# Patient Record
Sex: FEMALE | Race: WHITE | NOT HISPANIC OR LATINO | Employment: OTHER | URBAN - METROPOLITAN AREA
[De-identification: names, ages, dates, MRNs, and addresses within clinical notes are randomized per-mention and may not be internally consistent; named-entity substitution may affect disease eponyms.]

---

## 2017-03-09 ENCOUNTER — HOSPITAL ENCOUNTER (OUTPATIENT)
Dept: LAB | Facility: MEDICAL CENTER | Age: 72
End: 2017-03-09
Attending: INTERNAL MEDICINE
Payer: MEDICARE

## 2017-03-09 LAB — 25(OH)D3 SERPL-MCNC: 56 NG/ML (ref 30–100)

## 2017-03-09 PROCEDURE — 82306 VITAMIN D 25 HYDROXY: CPT

## 2017-03-09 PROCEDURE — 36415 COLL VENOUS BLD VENIPUNCTURE: CPT

## 2017-03-10 NOTE — PROGRESS NOTES
Quick Note:    The result was discussed with patient via My Chart.    Angela Lakhani M.D.    ______

## 2017-03-21 ENCOUNTER — TELEPHONE (OUTPATIENT)
Dept: MEDICAL GROUP | Facility: MEDICAL CENTER | Age: 72
End: 2017-03-21

## 2017-03-21 NOTE — TELEPHONE ENCOUNTER
1. Caller Name: Maryann Cardona                                           Call Back Number: [phone] (home) [phone] (work)        Patient approves a detailed voicemail message: N\A    Patient wanted to know if you would order labs for the patient for fasting AM Cortisol and Phosphorous per patient endocrinologist Dr.Scott Bishop ? Thank you Please advise

## 2017-04-13 ENCOUNTER — OFFICE VISIT (OUTPATIENT)
Dept: MEDICAL GROUP | Age: 72
End: 2017-04-13
Payer: MEDICARE

## 2017-04-13 VITALS
TEMPERATURE: 98.2 F | BODY MASS INDEX: 18.9 KG/M2 | OXYGEN SATURATION: 97 % | SYSTOLIC BLOOD PRESSURE: 106 MMHG | RESPIRATION RATE: 16 BRPM | HEART RATE: 82 BPM | WEIGHT: 132 LBS | HEIGHT: 70 IN | DIASTOLIC BLOOD PRESSURE: 68 MMHG

## 2017-04-13 DIAGNOSIS — E78.00 HYPERCHOLESTEROLEMIA: ICD-10-CM

## 2017-04-13 DIAGNOSIS — Z12.31 VISIT FOR SCREENING MAMMOGRAM: ICD-10-CM

## 2017-04-13 DIAGNOSIS — Z86.39 HISTORY OF HYPERPARATHYROIDISM: ICD-10-CM

## 2017-04-13 DIAGNOSIS — M81.0 OSTEOPOROSIS: ICD-10-CM

## 2017-04-13 DIAGNOSIS — G47.25 JET LAG: ICD-10-CM

## 2017-04-13 PROCEDURE — 99214 OFFICE O/P EST MOD 30 MIN: CPT | Performed by: INTERNAL MEDICINE

## 2017-04-13 RX ORDER — ALPRAZOLAM 0.25 MG/1
0.25 TABLET ORAL NIGHTLY PRN
Qty: 20 TAB | Refills: 0 | Status: SHIPPED | OUTPATIENT
Start: 2017-04-13 | End: 2017-12-19 | Stop reason: SDUPTHER

## 2017-04-13 ASSESSMENT — PAIN SCALES - GENERAL: PAINLEVEL: NO PAIN

## 2017-04-13 NOTE — MR AVS SNAPSHOT
"        Maryann Cardona   2017 3:20 PM   Office Visit   MRN: [id number]    Department:  [address] [place] [address]   Dept Phone:  [phone]    Description:  Female : 1945   Provider:  Angela Lakhani M.D.           Reason for Visit     Establish Care           Allergies as of 2017     Allergen Noted Reactions    Ofloxacin 2011   Shortness of Breath, Rash, Swelling    Severe facial and neck swelling    Vicodin [Hydrocodone-Acetaminophen] 2011   Anxiety    Ambien [Zolpidem Tartrate] 2012       hallucinations      You were diagnosed with     History of hyperparathyroidism   [715201]       Hypercholesterolemia   [495471]       Osteoporosis   [9847614]       Visit for screening mammogram   [330140]       Jet lag   [194475]         Vital Signs     Blood Pressure Pulse Temperature Respirations Height Weight    106/68 mmHg 82 36.8 °C (98.2 °F) 16 1.778 m (5' 10\") 59.875 kg (132 lb)    Body Mass Index Oxygen Saturation Breastfeeding? Smoking Status          18.94 kg/m2 97% No Never Smoker         Basic Information     Date Of Birth Sex Race Ethnicity Preferred Language    1945 Female White Non- English      Your appointments     May 02, 2017  1:40 PM   MA SCRN10 with S EDELAN MG 1   [facility] ([place])    [address]  [place] [id number] [phone]           No deodorant, powder, perfume or lotion under the arm or breast area.            Dec 19, 2017  1:00 PM   ANNUAL EXAM PREVENTATIVE with Angela Lakhani M.D.   [address] [place] [id number] [phone]              Problem List              ICD-10-CM Priority Class Noted - Resolved    Osteoporosis M81.0   11/3/2015 - Present    Glaucoma H40.9   11/3/2015 - Present    Underweight R63.6   11/3/2015 - Present    History of hyperparathyroidism Z86.39   2016 - Present    Hypercholesterolemia E78.00   " 4/13/2017 - Present    Jet lag G47.25   4/13/2017 - Present      Health Maintenance        Date Due Completion Dates    MAMMOGRAM 1/28/2017 1/28/2016, 1/27/2015, 12/30/2013, 12/12/2012, 12/9/2011, 11/16/2010, 10/2/2009, 10/2/2009, 10/1/2008, 10/1/2008, 9/14/2007, 9/14/2007    BONE DENSITY 1/28/2018 1/28/2016, 1/27/2015, 12/12/2012, 8/17/2011, 10/1/2008, 10/26/2006    COLONOSCOPY 11/3/2025 11/3/2015 (Postponed)    Override on 11/3/2015: Postponed    IMM DTaP/Tdap/Td Vaccine (2 - Td) 3/27/2026 3/27/2016            Current Immunizations     13-VALENT PCV PREVNAR 12/13/2016    Pneumococcal polysaccharide vaccine (PPSV-23) 9/24/2011  6:44 AM    SHINGLES VACCINE 9/16/2013    Tdap Vaccine 3/27/2016  9:27 PM      Below and/or attached are the medications your provider expects you to take. Review all of your home medications and newly ordered medications with your provider and/or pharmacist. Follow medication instructions as directed by your provider and/or pharmacist. Please keep your medication list with you and share with your provider. Update the information when medications are discontinued, doses are changed, or new medications (including over-the-counter products) are added; and carry medication information at all times in the event of emergency situations     Allergies:  OFLOXACIN - Shortness of Breath,Rash,Swelling     VICODIN - Anxiety     AMBIEN - (reactions not documented)               Medications  Valid as of: April 13, 2017 -  4:23 PM    Generic Name Brand Name Tablet Size Instructions for use    ALPRAZolam (Tab) XANAX 0.25 MG Take 1 Tab by mouth at bedtime as needed for Sleep.        Brimonidine Tartrate (Solution) ALPHAGAN P 0.1 % 1 Drop by Ophthalmic route 2 Times a Day.        Dorzolamide HCl-Timolol Mal (Solution) COSOPT 22.3-6.8 MG/ML Place 1 Drop in both eyes 2 times a day.        Latanoprost (Solution) XALATAN 0.005 % Place 1 Drop in both eyes every evening.        Naproxen Sodium   Take  by mouth.         .                 Medicines prescribed today were sent to:     [id number] - [place], [place] - [address] AT [facility] [address]    [address] [place] [address]    Phone: [phone] Fax: [phone]    Open 24 Hours?: No      Medication refill instructions:       If your prescription bottle indicates you have medication refills left, it is not necessary to call your provider’s office. Please contact your pharmacy and they will refill your medication.    If your prescription bottle indicates you do not have any refills left, you may request refills at any time through one of the following ways: The online [employer] system (except Urgent Care), by calling your provider’s office, or by asking your pharmacy to contact your provider’s office with a refill request. Medication refills are processed only during regular business hours and may not be available until the next business day. Your provider may request additional information or to have a follow-up visit with you prior to refilling your medication.   *Please Note: Medication refills are assigned a new Rx number when refilled electronically. Your pharmacy may indicate that no refills were authorized even though a new prescription for the same medication is available at the pharmacy. Please request the medicine by name with the pharmacy before contacting your provider for a refill.        Your To Do List     Future Labs/Procedures Complete By Expires    CBC WITH DIFFERENTIAL  As directed 4/14/2018    COMP METABOLIC PANEL  As directed 4/14/2018    LIPID PROFILE  As directed 4/14/2018    MA-SCREEN MAMMO W/CAD-BILAT  As directed 5/15/2018    PHOSPHORUS  As directed 4/13/2018    VITAMIN D,25 HYDROXY  As directed 4/14/2018         [employer] Access Code: Activation code not generated  Current Pro[employer] Status: Active

## 2017-04-14 NOTE — ASSESSMENT & PLAN NOTE
Patient stated that she traveled a lot and she has jet lag during traveling. She wants to have medication for jet lag. She did not tolerate Ambien due to hangover effect in next day. We discussed to try low dose Xanax for one- two days.

## 2017-04-14 NOTE — ASSESSMENT & PLAN NOTE
Patient reported that she has hyperparathyroidism in the past and one parathyroid gland was removed. She follows with Dr. Souza, her endocrinologist once a year. She had ultrasound Neck at endocrinologist clinic last week and was told that it was normal. Last CMP in December 2016 showed normal calcium level. She requested to test for phosphorus in future lab.

## 2017-04-14 NOTE — ASSESSMENT & PLAN NOTE
Patient has osteoporosis. She declined to be treated with alendronate or prolia or any bisphosphonates as she concerned potential side effects from taking bisphosphonate. She stated that she did not have side effects when she was treated with alendronate. She insisted not to be treated. Her mom had severe side effects from taking bisphosphonate. She takes her vitamin D and calcium regularly. She also discussed with her endocrinologist and she declined the medication treatment by endocrinologist.  I have reviewed her last DEXA scan done in 1/2016. I also discussed treatment options with patient again. However, she declined it and stated that she understands the risks of fracture, but she will not take medication.

## 2017-04-14 NOTE — PROGRESS NOTES
Subjective:   Maryann Cardona is a 72 y.o. female here today for evaluation and management of:      History of hyperparathyroidism  Patient reported that she has hyperparathyroidism in the past and one parathyroid gland was removed. She follows with Dr. Souza, her endocrinologist once a year. She had ultrasound Neck at endocrinologist clinic last week and was told that it was normal. Last CMP in December 2016 showed normal calcium level. She requested to test for phosphorus in future lab.    Hypercholesterolemia  Patient has elevated total cholesterol and LDL cholesterol. She declined to be treated with medications. She attempts to control her cholesterol with diet and exercise. However, she likes to eat a lot of cheese and butter. She reported that she exercises regularly.    Jet lag  Patient stated that she traveled a lot and she has jet lag during traveling. She wants to have medication for jet lag. She did not tolerate Ambien due to hangover effect in next day. We discussed to try low dose Xanax for one- two days.    Osteoporosis  Patient has osteoporosis. She declined to be treated with alendronate or prolia or any bisphosphonates as she concerned potential side effects from taking bisphosphonate. She stated that she did not have side effects when she was treated with alendronate. She insisted not to be treated. Her mom had severe side effects from taking bisphosphonate. She takes her vitamin D and calcium regularly. She also discussed with her endocrinologist and she declined the medication treatment by endocrinologist.  I have reviewed her last DEXA scan done in 1/2016. I also discussed treatment options with patient again. However, she declined it and stated that she understands the risks of fracture, but she will not take medication.         Current medicines (including changes today)  Current Outpatient Prescriptions   Medication Sig Dispense Refill   • alprazolam (XANAX) 0.25 MG Tab Take 1 Tab by  "mouth at bedtime as needed for Sleep. 20 Tab 0   • Naproxen Sodium (ALEVE PO) Take  by mouth.     • Brimonidine Tartrate (ALPHAGAN P) 0.1 % SOLN 1 Drop by Ophthalmic route 2 Times a Day.     • dorzolamide-timolol (COSOPT) 22.3-6.8 MG/ML SOLN Place 1 Drop in both eyes 2 times a day.     • latanoprost (XALATAN) 0.005 % SOLN Place 1 Drop in both eyes every evening.       No current facility-administered medications for this visit.     She  has a past medical history of Arrhythmia; Other specified disorder of intestines; Glaucoma; Pain; CATARACT; and Anesthesia.    ROS   No chest pain, no shortness of breath, no abdominal pain       Objective:     Blood pressure 106/68, pulse 82, temperature 36.8 °C (98.2 °F), resp. rate 16, height 1.778 m (5' 10\"), weight 59.875 kg (132 lb), SpO2 97 %, not currently breastfeeding. Body mass index is 18.94 kg/(m^2).   Physical Exam:  General: Alert, oriented and no acute distress.  Eye contact is good, speech goal directed, affect calm  HEENT: conjunctiva non-injected, sclera non-icteric.  Oral mucous membranes pink and moist with no lesions.  Pinna normal.   Lungs: Normal respiratory effort, clear to auscultation bilaterally with good excursion.  CV: regular rate and rhythm. No murmurs.   Abdomen: soft, non distended, nontender, Bowel sound normal.  Ext: no edema, color normal, vascularity normal, temperature normal        Assessment and Plan:   The following treatment plan was discussed     1. History of hyperparathyroidism  - Stable. Follows with endocrinologist once a year.  - CBC WITH DIFFERENTIAL; Future  - COMP METABOLIC PANEL; Future  - PHOSPHORUS; Future    2. Hypercholesterolemia  - Not on medication. Patient declined to be treated with medication.  - Advised to eat low fat, low carbohydrate and high fiber diet as well as do cardio physical exercise regularly.   - COMP METABOLIC PANEL; Future  - LIPID PROFILE; Future    3. Osteoporosis  - Patient declined to take " bisphosphonates. Continue vitamin D and calcium supplements. Encouraged to do weightbearing exercise to maintain healthy body weight. She has DEXA scan ordered by endocrinologist.  - CBC WITH DIFFERENTIAL; Future  - COMP METABOLIC PANEL; Future  - VITAMIN D,25 HYDROXY; Future  - PHOSPHORUS; Future    4. Jet lag  - Will prescribe small dose of Xanax. She is advised to rare use only due to sedation and addiction.  - Advised to be cautious for sedation effect of Xanax and recommend not to take it with alcohol or other sedating medications. Recommend to avoid driving or operating heavy machinery when she feels drowsy or sleepy from taking medication.  - I have reviewed the Prescription Monitoring Program () today before prescribing medication. No concerns on  review today.  - alprazolam (XANAX) 0.25 MG Tab; Take 1 Tab by mouth at bedtime as needed for Sleep.  Dispense: 20 Tab; Refill: 0    5. Visit for screening mammogram  - Order screening mammogram.  - MA-SCREEN MAMMO W/CAD-BILAT; Future        Followup: Return in about 1 year (around 4/13/2018), or if symptoms worsen or fail to improve, for annual exam.      Please note that this dictation was created using voice recognition software. I have made every reasonable attempt to correct obvious errors, but I expect that there may have unintended errors in text, spelling, punctuation, or grammar that I did not discover.

## 2017-04-14 NOTE — ASSESSMENT & PLAN NOTE
Patient has elevated total cholesterol and LDL cholesterol. She declined to be treated with medications. She attempts to control her cholesterol with diet and exercise. However, she likes to eat a lot of cheese and butter. She reported that she exercises regularly.

## 2017-05-02 ENCOUNTER — APPOINTMENT (OUTPATIENT)
Dept: RADIOLOGY | Facility: MEDICAL CENTER | Age: 72
End: 2017-05-02
Attending: INTERNAL MEDICINE
Payer: MEDICARE

## 2017-05-04 ENCOUNTER — HOSPITAL ENCOUNTER (OUTPATIENT)
Dept: LAB | Facility: MEDICAL CENTER | Age: 72
End: 2017-05-04
Attending: INTERNAL MEDICINE
Payer: MEDICARE

## 2017-05-04 DIAGNOSIS — M81.0 OSTEOPOROSIS: ICD-10-CM

## 2017-05-04 DIAGNOSIS — Z86.39 HISTORY OF HYPERPARATHYROIDISM: ICD-10-CM

## 2017-05-04 LAB
CORTIS SERPL-MCNC: 13.6 UG/DL (ref 0–23)
PHOSPHATE SERPL-MCNC: 3.6 MG/DL (ref 2.5–4.5)

## 2017-05-04 PROCEDURE — 36415 COLL VENOUS BLD VENIPUNCTURE: CPT

## 2017-05-04 PROCEDURE — 84100 ASSAY OF PHOSPHORUS: CPT

## 2017-08-09 ENCOUNTER — HOSPITAL ENCOUNTER (OUTPATIENT)
Dept: RADIOLOGY | Facility: MEDICAL CENTER | Age: 72
End: 2017-08-09
Attending: INTERNAL MEDICINE
Payer: MEDICARE

## 2017-08-09 DIAGNOSIS — Z12.31 VISIT FOR SCREENING MAMMOGRAM: ICD-10-CM

## 2017-08-09 PROCEDURE — 77063 BREAST TOMOSYNTHESIS BI: CPT

## 2017-11-27 ENCOUNTER — APPOINTMENT (RX ONLY)
Dept: URBAN - METROPOLITAN AREA CLINIC 108 | Facility: CLINIC | Age: 72
Setting detail: DERMATOLOGY
End: 2017-11-27

## 2017-11-27 DIAGNOSIS — L57.8 OTHER SKIN CHANGES DUE TO CHRONIC EXPOSURE TO NONIONIZING RADIATION: ICD-10-CM

## 2017-11-27 DIAGNOSIS — D485 NEOPLASM OF UNCERTAIN BEHAVIOR OF SKIN: ICD-10-CM

## 2017-11-27 PROBLEM — D48.5 NEOPLASM OF UNCERTAIN BEHAVIOR OF SKIN: Status: ACTIVE | Noted: 2017-11-27

## 2017-11-27 PROCEDURE — 99202 OFFICE O/P NEW SF 15 MIN: CPT | Mod: 25

## 2017-11-27 PROCEDURE — 11100: CPT

## 2017-11-27 PROCEDURE — ? COUNSELING

## 2017-11-27 PROCEDURE — ? BIOPSY BY SHAVE METHOD

## 2017-11-27 ASSESSMENT — LOCATION SIMPLE DESCRIPTION DERM: LOCATION SIMPLE: LEFT PRETIBIAL REGION

## 2017-11-27 ASSESSMENT — LOCATION ZONE DERM: LOCATION ZONE: LEG

## 2017-11-27 ASSESSMENT — LOCATION DETAILED DESCRIPTION DERM: LOCATION DETAILED: LEFT DISTAL PRETIBIAL REGION

## 2017-11-27 NOTE — PROCEDURE: BIOPSY BY SHAVE METHOD
Curettage Text: The wound bed was treated with curettage after the biopsy was performed.
Lab Facility: [facility]
Anesthesia Type: 1% lidocaine with epinephrine
Silver Nitrate Text: The wound bed was treated with silver nitrate after the biopsy was performed.
Bill For Surgical Tray: no
Billing Type: [employer]
Electrodesiccation Text: The wound bed was treated with electrodesiccation after the biopsy was performed.
Wound Care: Vaseline
Notification Instructions: Patient will be notified of biopsy results. However, patient instructed to call the office if not contacted within 2 weeks.
X Size Of Lesion In Cm: 0
Detail Level: Detailed
Dressing: bandage
Anesthesia Volume In Cc (Will Not Render If 0): 0.5
Biopsy Method: Personna blade
Biopsy Type: H and E
Cryotherapy Text: The wound bed was treated with cryotherapy after the biopsy was performed.
Hemostasis: Aluminum Chloride
Lab: [facility]
Post-Care Instructions: I reviewed with the patient in detail post-care instructions. Patient is to keep the biopsy site dry overnight, and then apply bacitracin twice daily until healed. Patient may apply hydrogen peroxide soaks to remove any crusting.
Consent: Written consent was obtained and risks were reviewed including but not limited to scarring, infection, bleeding, scabbing, incomplete removal, nerve damage and allergy to anesthesia.
Electrodesiccation And Curettage Text: The wound bed was treated with electrodesiccation and curettage after the biopsy was performed.
Type Of Destruction Used: Curettage
Body Location Override (Optional - Billing Will Still Be Based On Selected Body Map Location If Applicable): left lateral calf

## 2017-12-12 ENCOUNTER — HOSPITAL ENCOUNTER (OUTPATIENT)
Dept: LAB | Facility: MEDICAL CENTER | Age: 72
End: 2017-12-12
Attending: INTERNAL MEDICINE
Payer: MEDICARE

## 2017-12-12 ENCOUNTER — PATIENT OUTREACH (OUTPATIENT)
Dept: HEALTH INFORMATION MANAGEMENT | Facility: OTHER | Age: 72
End: 2017-12-12

## 2017-12-12 DIAGNOSIS — E78.00 HYPERCHOLESTEROLEMIA: ICD-10-CM

## 2017-12-12 DIAGNOSIS — Z86.39 HISTORY OF HYPERPARATHYROIDISM: ICD-10-CM

## 2017-12-12 DIAGNOSIS — M81.0 OSTEOPOROSIS: ICD-10-CM

## 2017-12-12 LAB
25(OH)D3 SERPL-MCNC: 54 NG/ML (ref 30–100)
ALBUMIN SERPL BCP-MCNC: 4.3 G/DL (ref 3.2–4.9)
ALBUMIN/GLOB SERPL: 1.7 G/DL
ALP SERPL-CCNC: 70 U/L (ref 30–99)
ALT SERPL-CCNC: 18 U/L (ref 2–50)
ANION GAP SERPL CALC-SCNC: 6 MMOL/L (ref 0–11.9)
AST SERPL-CCNC: 19 U/L (ref 12–45)
BASOPHILS # BLD AUTO: 1.4 % (ref 0–1.8)
BASOPHILS # BLD: 0.07 K/UL (ref 0–0.12)
BILIRUB SERPL-MCNC: 0.5 MG/DL (ref 0.1–1.5)
BUN SERPL-MCNC: 16 MG/DL (ref 8–22)
CALCIUM SERPL-MCNC: 9.7 MG/DL (ref 8.5–10.5)
CHLORIDE SERPL-SCNC: 106 MMOL/L (ref 96–112)
CHOLEST SERPL-MCNC: 206 MG/DL (ref 100–199)
CO2 SERPL-SCNC: 25 MMOL/L (ref 20–33)
CORTIS SERPL-MCNC: 14.4 UG/DL (ref 0–23)
CREAT SERPL-MCNC: 0.89 MG/DL (ref 0.5–1.4)
EOSINOPHIL # BLD AUTO: 0.43 K/UL (ref 0–0.51)
EOSINOPHIL NFR BLD: 8.3 % (ref 0–6.9)
ERYTHROCYTE [DISTWIDTH] IN BLOOD BY AUTOMATED COUNT: 44 FL (ref 35.9–50)
GFR SERPL CREATININE-BSD FRML MDRD: >60 ML/MIN/1.73 M 2
GLOBULIN SER CALC-MCNC: 2.5 G/DL (ref 1.9–3.5)
GLUCOSE SERPL-MCNC: 93 MG/DL (ref 65–99)
HCT VFR BLD AUTO: 42 % (ref 37–47)
HDLC SERPL-MCNC: 93 MG/DL
HGB BLD-MCNC: 13.6 G/DL (ref 12–16)
IMM GRANULOCYTES # BLD AUTO: 0.01 K/UL (ref 0–0.11)
IMM GRANULOCYTES NFR BLD AUTO: 0.2 % (ref 0–0.9)
LDLC SERPL CALC-MCNC: 96 MG/DL
LYMPHOCYTES # BLD AUTO: 1.87 K/UL (ref 1–4.8)
LYMPHOCYTES NFR BLD: 36.2 % (ref 22–41)
MCH RBC QN AUTO: 31.3 PG (ref 27–33)
MCHC RBC AUTO-ENTMCNC: 32.4 G/DL (ref 33.6–35)
MCV RBC AUTO: 96.8 FL (ref 81.4–97.8)
MONOCYTES # BLD AUTO: 0.4 K/UL (ref 0–0.85)
MONOCYTES NFR BLD AUTO: 7.7 % (ref 0–13.4)
NEUTROPHILS # BLD AUTO: 2.39 K/UL (ref 2–7.15)
NEUTROPHILS NFR BLD: 46.2 % (ref 44–72)
NRBC # BLD AUTO: 0 K/UL
NRBC BLD AUTO-RTO: 0 /100 WBC
PLATELET # BLD AUTO: 291 K/UL (ref 164–446)
PMV BLD AUTO: 9.7 FL (ref 9–12.9)
POTASSIUM SERPL-SCNC: 4.1 MMOL/L (ref 3.6–5.5)
PROT SERPL-MCNC: 6.8 G/DL (ref 6–8.2)
RBC # BLD AUTO: 4.34 M/UL (ref 4.2–5.4)
SODIUM SERPL-SCNC: 137 MMOL/L (ref 135–145)
TRIGL SERPL-MCNC: 85 MG/DL (ref 0–149)
TSH SERPL DL<=0.005 MIU/L-ACNC: 1.49 UIU/ML (ref 0.3–3.7)
WBC # BLD AUTO: 5.2 K/UL (ref 4.8–10.8)

## 2017-12-12 PROCEDURE — 36415 COLL VENOUS BLD VENIPUNCTURE: CPT

## 2017-12-12 PROCEDURE — 84443 ASSAY THYROID STIM HORMONE: CPT

## 2017-12-12 PROCEDURE — 82533 TOTAL CORTISOL: CPT

## 2017-12-12 PROCEDURE — 80061 LIPID PANEL: CPT

## 2017-12-12 PROCEDURE — 80053 COMPREHEN METABOLIC PANEL: CPT

## 2017-12-12 PROCEDURE — 85025 COMPLETE CBC W/AUTO DIFF WBC: CPT

## 2017-12-12 PROCEDURE — 82306 VITAMIN D 25 HYDROXY: CPT

## 2017-12-12 PROCEDURE — 82523 COLLAGEN CROSSLINKS: CPT

## 2017-12-12 NOTE — PROGRESS NOTES
Maryann said that she already scheduled an AWV months ago and wants to keep it like it is.   No more info provided, pt was in a rush.

## 2017-12-14 LAB — COLLAGEN CTX SERPL-MCNC: 543 PG/ML

## 2017-12-19 ENCOUNTER — OFFICE VISIT (OUTPATIENT)
Dept: MEDICAL GROUP | Age: 72
End: 2017-12-19
Payer: MEDICARE

## 2017-12-19 VITALS
WEIGHT: 130.2 LBS | SYSTOLIC BLOOD PRESSURE: 120 MMHG | OXYGEN SATURATION: 98 % | BODY MASS INDEX: 18.64 KG/M2 | RESPIRATION RATE: 12 BRPM | HEART RATE: 60 BPM | HEIGHT: 70 IN | DIASTOLIC BLOOD PRESSURE: 54 MMHG

## 2017-12-19 DIAGNOSIS — E78.00 HYPERCHOLESTEROLEMIA: ICD-10-CM

## 2017-12-19 DIAGNOSIS — G47.25 JET LAG: ICD-10-CM

## 2017-12-19 DIAGNOSIS — M81.0 AGE-RELATED OSTEOPOROSIS WITHOUT CURRENT PATHOLOGICAL FRACTURE: ICD-10-CM

## 2017-12-19 DIAGNOSIS — Z86.39 HISTORY OF HYPERPARATHYROIDISM: ICD-10-CM

## 2017-12-19 PROCEDURE — 99214 OFFICE O/P EST MOD 30 MIN: CPT | Performed by: INTERNAL MEDICINE

## 2017-12-19 RX ORDER — ALPRAZOLAM 0.5 MG/1
0.5 TABLET ORAL NIGHTLY PRN
Qty: 30 TAB | Refills: 0 | Status: SHIPPED | OUTPATIENT
Start: 2017-12-19 | End: 2018-01-18

## 2017-12-19 RX ORDER — ALPRAZOLAM 0.25 MG/1
0.5 TABLET ORAL NIGHTLY PRN
Qty: 30 TAB | Refills: 0 | Status: SHIPPED | OUTPATIENT
Start: 2017-12-19 | End: 2017-12-19 | Stop reason: SDUPTHER

## 2017-12-19 NOTE — ASSESSMENT & PLAN NOTE
Patient follows with endocrinologist, Dr. Solares for hyperparathyroid disease. Her calcium and vitamin D levels are within normal. Her recent thyroid function tests, cortisol level are within normal.

## 2017-12-19 NOTE — ASSESSMENT & PLAN NOTE
Patient tries to control her cholesterol with diet and exercise. She has elevated total cholesterol. Her LDL cholesterol and triglycerides are within normal. We discussed to continue to control cholesterol with diet and exercise.

## 2017-12-19 NOTE — PROGRESS NOTES
Subjective:   Maryann Cardona is a 72 y.o. female here today for evaluation and management of:      Osteoporosis  Patient has osteoporosis on lumbar spine and femur. She has increased risks of fracture from osteoporosis. Patient insists not to be treated with medication including oral bisphosphonates or injection form or Prolia as she is concerning of potential side effects. She is understanding worsening of osteoporosis without treatment, but still declined to be treated with prescription medication. She will continue to take vitamin D and calcium supplement only. Recent vitamin D level was 54 on 12/12/17. She has DEXA scan on 1/28/16 showed osteoporosis on both femur and lumbar spine with increased risk of fracture. She needs to repeat DEXA scan on 1/28/18.    Hypercholesterolemia  Patient tries to control her cholesterol with diet and exercise. She has elevated total cholesterol. Her LDL cholesterol and triglycerides are within normal. We discussed to continue to control cholesterol with diet and exercise.    History of hyperparathyroidism  Patient follows with endocrinologist, Dr. Solares for hyperparathyroid disease. Her calcium and vitamin D levels are within normal. Her recent thyroid function tests, cortisol level are within normal.    Jet lag  Patient reported that she is not able to sleep well when she travels. She also has insomnia and generally but she does not take medication for that. However, when she travels. She wants to have symptoms and is to help her sleep well. She requests to refill Xanax. She stated that she needs to take Xanax 0.25 mg 2 tablets to make her fall asleep. She wants to have Xanax 0.5 mg instead of 0.25 mg. She denied drinking alcohol and she also denied side effects from taking Xanax. She insisted to refill Xanax 0.5 mg 30 tablet for her as she needs to take it during her trip in January 2018. Patient reported that she travels a lot about 3-4 times a year.         Current  "medicines (including changes today)  Current Outpatient Prescriptions   Medication Sig Dispense Refill   • alprazolam (XANAX) 0.5 MG Tab Take 1 Tab by mouth at bedtime as needed for Sleep for up to 30 days. 30 Tab 0   • Naproxen Sodium (ALEVE PO) Take  by mouth.     • Brimonidine Tartrate (ALPHAGAN P) 0.1 % SOLN 1 Drop by Ophthalmic route 2 Times a Day.     • dorzolamide-timolol (COSOPT) 22.3-6.8 MG/ML SOLN Place 1 Drop in both eyes 2 times a day.     • latanoprost (XALATAN) 0.005 % SOLN Place 1 Drop in both eyes every evening.       No current facility-administered medications for this visit.      She  has a past medical history of Anesthesia; Arrhythmia; CATARACT; Glaucoma; Other specified disorder of intestines; and Pain.    ROS   No chest pain, no shortness of breath, no abdominal pain       Objective:     Blood pressure 120/54, pulse 60, resp. rate 12, height 1.778 m (5' 10\"), weight 59.1 kg (130 lb 3.2 oz), SpO2 98 %. Body mass index is 18.68 kg/m².   Physical Exam:  General: Alert, oriented and no acute distress.  Eye contact is good, speech goal directed, affect calm  HEENT: conjunctiva non-injected, sclera non-icteric.  Oral mucous membranes pink and moist with no lesions.  Pinna normal.   Lungs: Normal respiratory effort, clear to auscultation bilaterally with good excursion.  CV: regular rate and rhythm. No murmurs.  Abdomen: soft, non distended, nontender, Bowel sound normal.  Ext: no edema, color normal, vascularity normal, temperature normal      Assessment and Plan:   The following treatment plan was discussed     1. Age-related osteoporosis without current pathological fracture  - Patient declined to be treated with medication. She will like to continue vitamin D and calcium supplement only.   - She has scheduled for DEXA scan in January 2018  - She will follow-up with endocrinologist for hyperparathyroid and osteoporosis.  - I recommend her to do regular weightbearing exercise to keep healthy body " weight. I also advised her to continue vitamin D and calcium supplements daily.   - CBC WITH DIFFERENTIAL; Future  - COMP METABOLIC PANEL; Future  - VITAMIN D,25 HYDROXY; Future    2. Hypercholesterolemia  - Well-controlled. Continue to control with diet and exercise. Recheck lab 1-2 weeks before next follow up visit.   - Advised to eat low fat, low carbohydrate and high fiber diet as well as do cardio physical exercise regularly.   - COMP METABOLIC PANEL; Future  - LIPID PROFILE; Future    3. History of hyperparathyroidism  - Stable. Asymptomatic except osteoporosis. Follow-up with endocrinologist, Dr. Souza.     4. Jet lag  - Discussed the risks and side effects of Xanax with patient including risk of dependency and recommended to rare use only.  - Advised to be cautious for sedation effect of Xanax and recommend not to take it with alcohol or other sedating medications. Recommend to avoid driving or operating heavy machinery when she feels drowsy or sleepy from taking medication.  - Refill Xanax for 30 tablet. Patient is understanding that we are not refilling Xanax every month.  - alprazolam (XANAX) 0.5 MG Tab; Take 1 Tab by mouth at bedtime as needed for Sleep for up to 30 days.  Dispense: 30 Tab; Refill: 0    5. Health Maintenance   - Discussed to stop doing Pap smear for cervical cancer screening based on her age as patient has normal Pap smear all the time in the past. Patient also had complete hysterectomy in 1999. Patient agreed to stop doing Pap smear.       Followup: Return in about 4 weeks (around 1/16/2018), or if symptoms worsen or fail to improve, for Medicare annual wellness visit.      Please note that this dictation was created using voice recognition software. I have made every reasonable attempt to correct obvious errors, but I expect that there may have unintended errors in text, spelling, punctuation, or grammar that I did not discover.

## 2017-12-19 NOTE — ASSESSMENT & PLAN NOTE
Patient reported that she is not able to sleep well when she travels. She also has insomnia and generally but she does not take medication for that. However, when she travels. She wants to have symptoms and is to help her sleep well. She requests to refill Xanax. She stated that she needs to take Xanax 0.25 mg 2 tablets to make her fall asleep. She wants to have Xanax 0.5 mg instead of 0.25 mg. She denied drinking alcohol and she also denied side effects from taking Xanax. She insisted to refill Xanax 0.5 mg 30 tablet for her as she needs to take it during her trip in January 2018. Patient reported that she travels a lot about 3-4 times a year.

## 2018-01-05 ENCOUNTER — TELEPHONE (OUTPATIENT)
Dept: MEDICAL GROUP | Age: 73
End: 2018-01-05

## 2018-01-05 NOTE — TELEPHONE ENCOUNTER
Pt called stating that she is returning your call. You can call her back at [phone]. She did say that she is working so might not answer, but a detailed message can be left on her voicemail.

## 2018-01-11 ENCOUNTER — APPOINTMENT (OUTPATIENT)
Dept: MEDICAL GROUP | Age: 73
End: 2018-01-11
Payer: MEDICARE

## 2018-02-16 ENCOUNTER — HOSPITAL ENCOUNTER (OUTPATIENT)
Dept: RADIOLOGY | Facility: MEDICAL CENTER | Age: 73
End: 2018-02-16
Attending: INTERNAL MEDICINE
Payer: MEDICARE

## 2018-02-16 DIAGNOSIS — M81.0 AGE-RELATED OSTEOPOROSIS WITHOUT CURRENT PATHOLOGICAL FRACTURE: ICD-10-CM

## 2018-02-16 PROCEDURE — 77080 DXA BONE DENSITY AXIAL: CPT

## 2018-02-28 ENCOUNTER — TELEPHONE (OUTPATIENT)
Dept: MEDICAL GROUP | Age: 73
End: 2018-02-28

## 2018-02-28 NOTE — TELEPHONE ENCOUNTER
Future Appointments       Provider Department Center    3/5/2018 3:00 PM Angela Lakhani M.D.; [facility] [address]        ANNUAL WELLNESS VISIT PRE-VISIT PLANNING WITHOUT OUTREACH    1.  Reviewed note from last office visit with PCP: YES    2.  If any orders were placed at last visit, do we have Results/Consult Notes?        •  Labs - Labs ordered, NOT completed. Patient advised to complete prior to next appointment.  Note: If patient appointment is for lab review and patient did not complete labs, check with provider if OK to reschedule patient until labs completed.       •  Imaging - Imaging ordered, completed and results are in chart.       •  Referrals - No referrals were ordered at last office visit.    3.  Immunizations were updated in Epic using WebIZ?: Epic matches WebIZ       •  WebIZ Recommendations: FLU        •  Is patient due for Tdap? NO       •  Is patient due for Shingles? NO     4.  Patient is due for the following Health Maintenance Topics:   Health Maintenance Due   Topic Date Due   • Annual Wellness Visit  12/14/2017       - Patient is up-to-date on all Health Maintenance topics. No records have been requested at this time.    5.  Reviewed/Updated the following with patient:       •   Preferred Pharmacy? YES       •   Preferred Lab? YES       •   Preferred Communication? YES       •   Allergies? YES       •   Medications? YES. Was Abstract Encounter opened and chart updated? YES       •   Social History? YES. Was Abstract Encounter opened and chart updated? YES       •   Family History (document living status of immediate family members and if + hx of  cancer, diabetes, hypertension, hyperlipidemia, heart attack, stroke) YES. Was Abstract Encounter opened and chart updated? YES    6.  Care Team Updated:       •   DME Company (gait device, O2, CPAP, etc.): YES       •   Other Specialists (eye doctor, derm, GYN, cardiology, endo, etc): YES    7.  Patient has  the following Care Path diagnoses on Problem List:  NONE    8.  MDX printed and highlighted for Provider? N\A    9.  Patient was advised: “This is a free wellness visit. The provider will screen for medical conditions to help you stay healthy. If you have other concerns to address you may be asked to discuss these at a separate visit or there may be an additional fee.”     10.  Patient was informed to arrive 15 min prior to their scheduled appointment and bring in their medication bottles.

## 2018-03-05 ENCOUNTER — APPOINTMENT (OUTPATIENT)
Dept: MEDICAL GROUP | Age: 73
End: 2018-03-05
Payer: MEDICARE

## 2018-04-04 ENCOUNTER — OFFICE VISIT (OUTPATIENT)
Dept: MEDICAL GROUP | Age: 73
End: 2018-04-04
Payer: MEDICARE

## 2018-04-04 VITALS
OXYGEN SATURATION: 97 % | HEART RATE: 71 BPM | WEIGHT: 132.8 LBS | TEMPERATURE: 97.8 F | BODY MASS INDEX: 19.01 KG/M2 | HEIGHT: 70 IN | SYSTOLIC BLOOD PRESSURE: 128 MMHG | DIASTOLIC BLOOD PRESSURE: 66 MMHG

## 2018-04-04 DIAGNOSIS — E78.00 HYPERCHOLESTEROLEMIA: ICD-10-CM

## 2018-04-04 DIAGNOSIS — M85.80 OSTEOPENIA WITH HIGH RISK OF FRACTURE: ICD-10-CM

## 2018-04-04 DIAGNOSIS — Z86.39 HISTORY OF HYPERPARATHYROIDISM: ICD-10-CM

## 2018-04-04 DIAGNOSIS — Z91.81 RISK FOR FALLS: ICD-10-CM

## 2018-04-04 DIAGNOSIS — R63.6 UNDERWEIGHT: ICD-10-CM

## 2018-04-04 DIAGNOSIS — H40.1134 PRIMARY OPEN ANGLE GLAUCOMA OF BOTH EYES, INDETERMINATE STAGE: ICD-10-CM

## 2018-04-04 DIAGNOSIS — Z00.00 MEDICARE ANNUAL WELLNESS VISIT, SUBSEQUENT: ICD-10-CM

## 2018-04-04 PROCEDURE — G0439 PPPS, SUBSEQ VISIT: HCPCS | Performed by: INTERNAL MEDICINE

## 2018-04-04 RX ORDER — CHOLECALCIFEROL (VITAMIN D3) 50 MCG
TABLET ORAL DAILY
COMMUNITY
End: 2021-11-04 | Stop reason: SDUPTHER

## 2018-04-04 ASSESSMENT — PATIENT HEALTH QUESTIONNAIRE - PHQ9: CLINICAL INTERPRETATION OF PHQ2 SCORE: 0

## 2018-04-04 ASSESSMENT — PAIN SCALES - GENERAL: PAINLEVEL: NO PAIN

## 2018-04-04 ASSESSMENT — ACTIVITIES OF DAILY LIVING (ADL): BATHING_REQUIRES_ASSISTANCE: 0

## 2018-04-04 NOTE — PROGRESS NOTES
Chief Complaint   Patient presents with   • Annual Wellness Visit              HPI:  Maryann is a 73 y.o. here for Medicare Annual Wellness Visit        Patient Active Problem List    Diagnosis Date Noted   • Hypercholesterolemia 04/13/2017   • Jet lag 04/13/2017   • History of hyperparathyroidism 12/13/2016   • Osteopenia with high risk of fracture 11/03/2015   • Primary open angle glaucoma of both eyes, indeterminate stage 11/03/2015   • Underweight 11/03/2015       Current Outpatient Prescriptions   Medication Sig Dispense Refill   • Cholecalciferol (VITAMIN D) 2000 UNIT Tab Take  by mouth every day.     • Naproxen Sodium (ALEVE PO) Take  by mouth.     • Brimonidine Tartrate (ALPHAGAN P) 0.1 % SOLN 1 Drop by Ophthalmic route 2 Times a Day.     • dorzolamide-timolol (COSOPT) 22.3-6.8 MG/ML SOLN Place 1 Drop in both eyes 2 times a day.     • latanoprost (XALATAN) 0.005 % SOLN Place 1 Drop in both eyes every evening.       No current facility-administered medications for this visit.         Patient is taking medications as noted in medication list.  Current supplements as per medication list.     Allergies: Ofloxacin; Vicodin [hydrocodone-acetaminophen]; and Ambien [zolpidem tartrate]    Current social contact/activities: go out with friends/travel   Patient's perception of their health: good    Is patient current with immunizations? No, due for FLU. Patient is interested in receiving NONE today.    She  reports that she has never smoked. She has never used smokeless tobacco. She reports that she drinks alcohol. She reports that she does not use drugs.  Counseling given: Yes        DPA/Advanced directive: Patient has Advanced Directive on file.     ROS:    Gait: Uses no assistive device   Ostomy: no   Other tubes: no   Amputations: no   Chronic oxygen use no   Last eye exam 12/17   Wears hearing aids: no   : Denies any urinary leakage during the last 6 months      Screening:        Depression Screening    Little  interest or pleasure in doing things?  0 - not at all  Feeling down, depressed, or hopeless? 0 - not at all  Patient Health Questionnaire Score: 0    If depressive symptoms identified deferred to follow up visit unless specifically addressed in assessment and plan.    Interpretation of PHQ-9 Total Score   Score Severity   1-4 No Depression   5-9 Mild Depression   10-14 Moderate Depression   15-19 Moderately Severe Depression   20-27 Severe Depression    Screening for Cognitive Impairment    Three Minute Recall (apple, watch, janey)  2/3 Table leader sunset  Draw clock face with all 12 numbers set to the hand to show 10 minutes past 11 o'clock  1 5/5  If cognitive concerns identified, deferred for follow up unless specifically addressed in assessment and plan.    Fall Risk Assessment    Has the patient had two or more falls in the last year or any fall with injury in the last year?  Yes  If fall risk identified, deferred for follow up unless specifically addressed in assessment and plan.    Safety Assessment    Throw rugs on floor.  Yes  Handrails on all stairs.  Yes  Good lighting in all hallways.  Yes  Difficulty hearing.  No  Patient counseled about all safety risks that were identified.    Functional Assessment ADLs    Are there any barriers preventing you from cooking for yourself or meeting nutritional needs?  No.    Are there any barriers preventing you from driving safely or obtaining transportation?  No.    Are there any barriers preventing you from using a telephone or calling for help?  No.    Are there any barriers preventing you from shopping?  No.    Are there any barriers preventing you from taking care of your own finances?  No.    Are there any barriers preventing you from managing your medications?  No.    Are there any barriers preventing you from showering, bathing or dressing yourself?  No.    Are you currently engaging any exercise or physical activity?  Yes.  [facility]  Summary                Annual Wellness Visit Overdue 12/14/2017      Done 12/13/2016 Visit Dx: Medicare annual wellness visit, subsequent    IMM INFLUENZA Postponed 12/18/2018 Originally 9/1/2018. Patient Refused    MAMMOGRAM Next Due 8/9/2018      Done 8/9/2017 MA-MAMMO SCREENING BILAT W/TOMOSYNTHESIS W/CAD     Patient has more history with this topic...    BONE DENSITY Next Due 2/16/2020      Done 2/16/2018 DS-BONE DENSITY STUDY (DEXA)     Patient has more history with this topic...    COLONOSCOPY Next Due 11/3/2025      Postponed 11/3/2015     IMM DTaP/Tdap/Td Vaccine Next Due 3/27/2026      Done 3/27/2016 Imm Admin: Tdap Vaccine          Patient Care Team:  Angela Lakhani M.D. as PCP - General (Internal Medicine)  Jeff Ariza M.D. as Consulting Physician (Dermatology)  Mainor Bishop M.D. as Consulting Physician (Gynecology)  Kelsey Souza M.D. as Consulting Physician (Endocrinology)  Hardeep Rios M.D. as Consulting Physician (Ophthalmology)    Social History   Substance Use Topics   • Smoking status: Never Smoker   • Smokeless tobacco: Never Used   • Alcohol use 0.0 oz/week      Comment: occ     Family History   Problem Relation Age of Onset   • Stroke Mother    • Lung Disease Mother    • Lung Disease Father    • Osteoporosis Brother    • Diabetes     • Hypertension     • Stroke     • Cancer     • Cancer Maternal Aunt      breast cancer     She  has a past medical history of Anesthesia; Arrhythmia; CATARACT; Glaucoma; Other specified disorder of intestines; and Pain.   Past Surgical History:   Procedure Laterality Date   • PARATHYROID EXPLORATION  2/1/2012    Performed by RICKY CASEY at SURGERY [facility] DAY [facility] ORS   • LUMBAR LAMINECTOMY DISKECTOMY  9/23/2011    Performed by JADEN WALL at SURGERY [facility] ORS   • LUMBAR DECOMPRESSION  9/23/2011    Performed by JADEN WALL at SURGERY [facility] ORS   • KNEE ARTHROSCOPY  10/21/2009    Performed by TRACI PADRON at [facility]  [facility] ORS   • MEDIAL MENISCECTOMY  10/21/2009    Performed by TRACI PADRON at SURGERY [facility]   • ABDOMINOPLASTY  2003   • HYSTERECTOMY, VAGINAL  1999   • ROTATOR CUFF REPAIR  1999   • APPENDECTOMY  1967   • US-NEEDLE CORE BX-BREAST PANEL  2001?    right breast, benign           Exam:     Blood pressure 128/66, pulse 71, temperature 36.6 °C (97.8 °F), height 1.778 m (5' 10\"), weight 60.2 kg (132 lb 12.8 oz), SpO2 97 %. Body mass index is 19.05 kg/m².    Hearing good.    Dentition good  Alert, oriented in no acute distress.  Eye contact is good, speech goal directed, affect calm      Assessment and Plan. The following treatment and monitoring plan is recommended:    1. Medicare annual wellness visit, subsequent  Annual Wellness Visit - Includes PPPS Subsequent ()   2. Underweight  Annual Wellness Visit - Includes PPPS Subsequent ()    Stable. Patient stated that she eats healthy diet, and she exercises regularly. Her body weight has been stable between 130 to 132 pounds.   3. Osteopenia with high risk of fracture  Annual Wellness Visit - Includes PPPS Subsequent ()    Cholecalciferol (VITAMIN D) 2000 UNIT Tab    Recent DEXA scan show bone density improvement to Osteopenia, but she still has high fracture risk. She refused to be treated with bisphosphonates or alternating medication.  She will only take vitamin D and calcium supplements regularly.   Patient is advised to do weightbearing exercises regularly to maintain healthy body weight and increased bone density.    4. Hypercholesterolemia  Annual Wellness Visit - Includes PPPS Subsequent ()    Well-controlled. Continue to control with diet and exercise. Continue to monitor.   5. History of hyperparathyroidism  Annual Wellness Visit - Includes PPPS Subsequent ()    Stable. Asymptomatic. Patient follows with Dr. Souza, endocrinologist once a year.   6. Primary open angle glaucoma of both eyes, indeterminate stage  Annual Wellness " Visit - Includes PPPS Subsequent ()    Chronic and stable. She is using 3 different eyedrops as prescribed by ophthalmologist. She follows with ophthalmologist regularly.   7. Risk for falls  Patient identified as fall risk.  Appropriate orders and counseling given.    Annual Wellness Visit - Includes PPPS Subsequent ()    Counseling for getting up slowly, turn on the light when she gets up at night, installing nightlight at home.  Review side effects of medications with patient. Recommend to avoid sedating medication as much as possible.          Services suggested: No services needed at this time  Health Care Screening: Age-appropriate preventive services recommended by USPTF and ACIP covered by Medicare were discussed today. Services ordered if indicated and agreed upon by the patient.  Referrals offered: PT/OT/Nutrition counseling/Behavioral Health/Smoking cessation as per orders if indicated.    Discussion today about general wellness and lifestyle habits:    · Prevent falls and reduce trip hazards; Cautioned about securing or removing rugs.  · Have a working fire alarm and carbon monoxide detector;   · Engage in regular physical activity and social activities       Follow-up: Return in about 8 months (around 12/13/2018), or if symptoms worsen or fail to improve, for Hypercholesterolemia, osteopenia, history of hyperparathyroidism, lab review.

## 2018-08-10 ENCOUNTER — HOSPITAL ENCOUNTER (OUTPATIENT)
Dept: RADIOLOGY | Facility: MEDICAL CENTER | Age: 73
End: 2018-08-10
Attending: INTERNAL MEDICINE
Payer: MEDICARE

## 2018-08-10 DIAGNOSIS — Z12.31 VISIT FOR SCREENING MAMMOGRAM: ICD-10-CM

## 2018-08-10 PROCEDURE — 77067 SCR MAMMO BI INCL CAD: CPT

## 2019-03-11 ENCOUNTER — HOSPITAL ENCOUNTER (OUTPATIENT)
Dept: LAB | Facility: MEDICAL CENTER | Age: 74
End: 2019-03-11
Attending: INTERNAL MEDICINE
Payer: MEDICARE

## 2019-03-11 ENCOUNTER — TELEPHONE (OUTPATIENT)
Dept: MEDICAL GROUP | Age: 74
End: 2019-03-11

## 2019-03-11 DIAGNOSIS — Z86.39 HISTORY OF HYPERPARATHYROIDISM: ICD-10-CM

## 2019-03-11 DIAGNOSIS — M85.80 OSTEOPENIA WITH HIGH RISK OF FRACTURE: ICD-10-CM

## 2019-03-11 DIAGNOSIS — E78.00 HYPERCHOLESTEROLEMIA: ICD-10-CM

## 2019-03-11 NOTE — TELEPHONE ENCOUNTER
VOICEMAIL 1:30pm  1. Caller Name:   Mindy from [id number]                    Call Back Number: [phone]    2. Message: [facility], Maryann is there to do her annual lab before her appointment this week. There are no lab order for her. Can you please put in an order for her to do lab work and follow up with Maryann. Thank you!    3. Patient approves office to leave a detailed voicemail/MyChart message: yes

## 2019-03-12 ENCOUNTER — HOSPITAL ENCOUNTER (OUTPATIENT)
Dept: LAB | Facility: MEDICAL CENTER | Age: 74
End: 2019-03-12
Attending: INTERNAL MEDICINE
Payer: MEDICARE

## 2019-03-12 DIAGNOSIS — E78.00 HYPERCHOLESTEROLEMIA: ICD-10-CM

## 2019-03-12 DIAGNOSIS — Z86.39 HISTORY OF HYPERPARATHYROIDISM: ICD-10-CM

## 2019-03-12 DIAGNOSIS — M85.80 OSTEOPENIA WITH HIGH RISK OF FRACTURE: ICD-10-CM

## 2019-03-12 LAB
25(OH)D3 SERPL-MCNC: 68 NG/ML (ref 30–100)
25(OH)D3 SERPL-MCNC: 71 NG/ML (ref 30–100)
ALBUMIN SERPL BCP-MCNC: 4.1 G/DL (ref 3.2–4.9)
ALBUMIN/GLOB SERPL: 1.6 G/DL
ALP SERPL-CCNC: 65 U/L (ref 30–99)
ALT SERPL-CCNC: 16 U/L (ref 2–50)
ANION GAP SERPL CALC-SCNC: 8 MMOL/L (ref 0–11.9)
AST SERPL-CCNC: 20 U/L (ref 12–45)
BASOPHILS # BLD AUTO: 1.2 % (ref 0–1.8)
BASOPHILS # BLD: 0.07 K/UL (ref 0–0.12)
BILIRUB SERPL-MCNC: 0.5 MG/DL (ref 0.1–1.5)
BUN SERPL-MCNC: 24 MG/DL (ref 8–22)
CALCIUM SERPL-MCNC: 9.9 MG/DL (ref 8.5–10.5)
CHLORIDE SERPL-SCNC: 109 MMOL/L (ref 96–112)
CHOLEST SERPL-MCNC: 195 MG/DL (ref 100–199)
CO2 SERPL-SCNC: 22 MMOL/L (ref 20–33)
CREAT SERPL-MCNC: 0.86 MG/DL (ref 0.5–1.4)
EOSINOPHIL # BLD AUTO: 0.38 K/UL (ref 0–0.51)
EOSINOPHIL NFR BLD: 6.6 % (ref 0–6.9)
ERYTHROCYTE [DISTWIDTH] IN BLOOD BY AUTOMATED COUNT: 50.8 FL (ref 35.9–50)
GLOBULIN SER CALC-MCNC: 2.6 G/DL (ref 1.9–3.5)
GLUCOSE SERPL-MCNC: 92 MG/DL (ref 65–99)
HCT VFR BLD AUTO: 36.5 % (ref 37–47)
HDLC SERPL-MCNC: 86 MG/DL
HGB BLD-MCNC: 11.5 G/DL (ref 12–16)
IMM GRANULOCYTES # BLD AUTO: 0.01 K/UL (ref 0–0.11)
IMM GRANULOCYTES NFR BLD AUTO: 0.2 % (ref 0–0.9)
LDLC SERPL CALC-MCNC: 91 MG/DL
LYMPHOCYTES # BLD AUTO: 1.45 K/UL (ref 1–4.8)
LYMPHOCYTES NFR BLD: 25.2 % (ref 22–41)
MCH RBC QN AUTO: 32.4 PG (ref 27–33)
MCHC RBC AUTO-ENTMCNC: 31.5 G/DL (ref 33.6–35)
MCV RBC AUTO: 102.8 FL (ref 81.4–97.8)
MONOCYTES # BLD AUTO: 0.56 K/UL (ref 0–0.85)
MONOCYTES NFR BLD AUTO: 9.7 % (ref 0–13.4)
NEUTROPHILS # BLD AUTO: 3.28 K/UL (ref 2–7.15)
NEUTROPHILS NFR BLD: 57.1 % (ref 44–72)
NRBC # BLD AUTO: 0 K/UL
NRBC BLD-RTO: 0 /100 WBC
PLATELET # BLD AUTO: 325 K/UL (ref 164–446)
PMV BLD AUTO: 10.1 FL (ref 9–12.9)
POTASSIUM SERPL-SCNC: 4.5 MMOL/L (ref 3.6–5.5)
PROT SERPL-MCNC: 6.7 G/DL (ref 6–8.2)
PTH-INTACT SERPL-MCNC: 38.5 PG/ML (ref 14–72)
RBC # BLD AUTO: 3.55 M/UL (ref 4.2–5.4)
SODIUM SERPL-SCNC: 139 MMOL/L (ref 135–145)
TRIGL SERPL-MCNC: 88 MG/DL (ref 0–149)
TSH SERPL DL<=0.005 MIU/L-ACNC: 0.93 UIU/ML (ref 0.38–5.33)
WBC # BLD AUTO: 5.8 K/UL (ref 4.8–10.8)

## 2019-03-12 PROCEDURE — 36415 COLL VENOUS BLD VENIPUNCTURE: CPT

## 2019-03-12 PROCEDURE — 80053 COMPREHEN METABOLIC PANEL: CPT

## 2019-03-12 PROCEDURE — 83970 ASSAY OF PARATHORMONE: CPT

## 2019-03-12 PROCEDURE — 82306 VITAMIN D 25 HYDROXY: CPT

## 2019-03-12 PROCEDURE — 85025 COMPLETE CBC W/AUTO DIFF WBC: CPT

## 2019-03-12 PROCEDURE — 80061 LIPID PANEL: CPT

## 2019-03-12 PROCEDURE — 84443 ASSAY THYROID STIM HORMONE: CPT

## 2019-03-12 PROCEDURE — 82306 VITAMIN D 25 HYDROXY: CPT | Mod: 91

## 2019-03-12 PROCEDURE — 82523 COLLAGEN CROSSLINKS: CPT

## 2019-03-12 NOTE — TELEPHONE ENCOUNTER
Blood test was ordered for patient.  I spoke with patient over the phone and advised her to do fasting blood test tomorrow morning.  She agreed with the plan.  She will keep her follow-up appointment with me on 3/14/19.    Angela Lakhani M.D.

## 2019-03-14 ENCOUNTER — OFFICE VISIT (OUTPATIENT)
Dept: MEDICAL GROUP | Age: 74
End: 2019-03-14
Payer: MEDICARE

## 2019-03-14 VITALS
HEIGHT: 70 IN | WEIGHT: 128 LBS | BODY MASS INDEX: 18.32 KG/M2 | OXYGEN SATURATION: 98 % | TEMPERATURE: 97.2 F | HEART RATE: 75 BPM | SYSTOLIC BLOOD PRESSURE: 128 MMHG | DIASTOLIC BLOOD PRESSURE: 60 MMHG

## 2019-03-14 DIAGNOSIS — E78.00 HYPERCHOLESTEROLEMIA: ICD-10-CM

## 2019-03-14 DIAGNOSIS — M85.80 OSTEOPENIA WITH HIGH RISK OF FRACTURE: ICD-10-CM

## 2019-03-14 DIAGNOSIS — Z23 NEED FOR SHINGLES VACCINE: ICD-10-CM

## 2019-03-14 DIAGNOSIS — G47.25 JET LAG: ICD-10-CM

## 2019-03-14 DIAGNOSIS — D53.9 MACROCYTIC ANEMIA: ICD-10-CM

## 2019-03-14 PROCEDURE — 99214 OFFICE O/P EST MOD 30 MIN: CPT | Performed by: INTERNAL MEDICINE

## 2019-03-14 RX ORDER — ALPRAZOLAM 0.5 MG/1
0.5 TABLET ORAL NIGHTLY PRN
Qty: 30 TAB | Refills: 0 | Status: SHIPPED | OUTPATIENT
Start: 2019-03-14 | End: 2019-04-13

## 2019-03-14 RX ORDER — MULTIVIT WITH MINERALS/LUTEIN
TABLET ORAL
COMMUNITY
End: 2020-11-04

## 2019-03-14 ASSESSMENT — PATIENT HEALTH QUESTIONNAIRE - PHQ9: CLINICAL INTERPRETATION OF PHQ2 SCORE: 0

## 2019-03-14 NOTE — LETTER
[facility]  Angela Lakhani M.D.  [address]  [place] [address]  Fax: [phone]   Authorization for Release/Disclosure of   Protected Health Information   Name: MARYANN DICKEY : 1945 SSN: [id number]   Address: [facility] [place] [address] Phone:    [phone] (home) [phone] (work)   I authorize the entity listed below to release/disclose the PHI below to:   [facility]/Agnela Lakhani M.D. and Angela Lakhani M.D.   Provider or Entity Name:  [facility]     Address   City, State, [facility]   Phone:      Fax:     Reason for request: continuity of care   Information to be released:    [  ] LAST COLONOSCOPY,  including any PATH REPORT and follow-up  [  ] LAST FIT/COLOGUARD RESULT [  ] LAST DEXA  [  ] LAST MAMMOGRAM  [  ] LAST PAP  [  ] LAST LABS [  ] RETINA EXAM REPORT  [  ] IMMUNIZATION RECORDS  [  ] Release all info      [  ] Check here and initial the line next to each item to release ALL health information INCLUDING  _____ Care and treatment for drug and / or alcohol abuse  _____ HIV testing, infection status, or AIDS  _____ Genetic Testing    DATES OF SERVICE OR TIME PERIOD TO BE DISCLOSED: _____________  I understand and acknowledge that:  * This Authorization may be revoked at any time by you in writing, except if your health information has already been used or disclosed.  * Your health information that will be used or disclosed as a result of you signing this authorization could be re-disclosed by the recipient. If this occurs, your re-disclosed health information may no longer be protected by State or Federal laws.  * You may refuse to sign this Authorization. Your refusal will not affect your ability to obtain treatment.  * This Authorization becomes effective upon signing and will  on (date) __________.      If no date is indicated, this Authorization will  one (1) year from the signature date.    Name: Maryann Dickey    Signature:   Date:          3/14/2019       PLEASE FAX REQUESTED RECORDS BACK TO: [phone]

## 2019-03-15 LAB — COLLAGEN CTX SERPL-MCNC: 461 PG/ML

## 2019-03-15 NOTE — PROGRESS NOTES
Subjective:   Maryann Cardona is a 74 y.o. female here today for evaluation and management of:      Osteopenia with high risk of fracture  Patient has osteopenia with high risk fracture.  She declined to take prescription medication.  She preferred to continue vitamin D and calcium supplements and do weightbearing exercise regularly.  Her last DEXA scan on 2/16/18 showed increase in bone density on both lumbar and left femur.    Macrocytic anemia  Patient has slightly anemic with elevated MCV on recent blood test.  Patient denied abdominal pain or black tarry stool or blood in urine.  She reported that she may take Aleve once every 2-3 months.  She is not taking any NSAIDs recently.  She stated that she drank a few cocktails in [place] during her vacation before her blood test.  She is taking vitamin B complex, vitamin C, vitamin D and vitamin K currently.    Jet lag  Patient stated that she plans to travel to [place] in June.  She needs to have Xanax refill.  She stated that she wanted to take it for anxiety during her flight and to help her to sleep.  She reported having difficulty to fall asleep when she traveled.  She denies side effects from taking Xanax.    Hypercholesterolemia  Patient has improvement on her cholesterol.  She tries to eat healthy diet and do regular physical exercise.  10-year risk of ASCVD score was 16.1%.  She declined to take statin or any prescription medication even after discussion of risks and benefits of treatment.  I reviewed blood test result with her in clinic today.    Results for MARYANN CARDONA (MRN [id number]) as of 3/14/2019 21:05   Ref. Range 12/7/2016 09:33 12/12/2017 08:47 3/12/2019 09:26   Cholesterol,Tot Latest Ref Range: 100 - 199 mg/dL 214 (H) 206 (H) 195   Triglycerides Latest Ref Range: 0 - 149 mg/dL 74 85 88   HDL Latest Ref Range: >=40 mg/dL 91 93 86   LDL Latest Ref Range: <100 mg/dL 108 (H) 96 91          Current medicines (including changes today)  Current  "Outpatient Prescriptions   Medication Sig Dispense Refill   • Ascorbic Acid (VITAMIN C) 1000 MG Tab Take  by mouth.     • VITAMIN K PO Take  by mouth.     • Zinc 50 MG Cap Take 50 mg by mouth every day.     • B Complex Vitamins (VITAMIN B COMPLEX PO) Take  by mouth.     • ALPRAZolam (XANAX) 0.5 MG Tab Take 1 Tab by mouth at bedtime as needed for Sleep or Anxiety for up to 30 days. 30 Tab 0   • Zoster Vac Recomb Adjuvanted (SHINGRIX) 50 MCG Recon Susp 0.5 mL by Intramuscular route Once for 1 dose. 0.5 mL 0   • Cholecalciferol (VITAMIN D) 2000 UNIT Tab Take  by mouth every day.     • Naproxen Sodium (ALEVE PO) Take  by mouth.     • Brimonidine Tartrate (ALPHAGAN P) 0.1 % SOLN 1 Drop by Ophthalmic route 2 Times a Day.     • dorzolamide-timolol (COSOPT) 22.3-6.8 MG/ML SOLN Place 1 Drop in both eyes 2 times a day.     • latanoprost (XALATAN) 0.005 % SOLN Place 1 Drop in both eyes every evening.       No current facility-administered medications for this visit.      She  has a past medical history of Anesthesia; Arrhythmia; CATARACT; Glaucoma; Other specified disorder of intestines; and Pain.    ROS   No chest pain, no shortness of breath, no abdominal pain       Objective:     Blood pressure 128/60, pulse 75, temperature 36.2 °C (97.2 °F), temperature source Temporal, height 1.778 m (5' 10\"), weight 58.1 kg (128 lb), SpO2 98 %, not currently breastfeeding. Body mass index is 18.37 kg/m².   Physical Exam:  General: Alert, oriented and no acute distress.  Eye contact is good, speech goal directed, affect calm  HEENT: conjunctiva non-injected, sclera non-icteric.  Oral mucous membranes pink and moist with no lesions.  Pinna normal.   Lungs: Normal respiratory effort, clear to auscultation bilaterally with good excursion.  CV: regular rate and rhythm. No murmurs.  Abdomen: soft, non distended, nontender, Bowel sound normal.  Ext: no edema, color normal, vascularity normal, temperature normal        Assessment and Plan:   The " following treatment plan was discussed     1. Osteopenia with high risk of fracture  - Patient declined to take prescription medication.  She preferred to continue to take calcium and vitamin D supplement and do weightbearing exercise.  - Her recent vitamin D level, parathyroid hormone level and TSH on 3/12/19 are within normal.  Patient follows with Dr. Kelsey Souza, endocrinologist regularly.  - CBC WITH DIFFERENTIAL; Future  - Comp Metabolic Panel; Future    2. Hypercholesterolemia  - Not on medication.  Patient declined to take statin or any prescription medication.  - Advised to eat low fat, low carbohydrate and high fiber diet as well as do cardio physical exercise regularly.  Recheck lab in 6 months.  - Lipid Profile; Future    3. Macrocytic anemia  - Slightly anemic.  Patient does not have any symptoms of abdominal pain or GI bleed.  Patient denied taking NSAIDs recently.  She has colonoscopy with [facility] in the past 5 years.  I discussed with patient to do stool fit test but she wanted to postpone it.  - I advised patient to avoid NSAIDs, alcohol and take vitamin B12 and folic acid.  Will recheck CBC and test for B12 and folate in 6 months.  - CBC WITH DIFFERENTIAL; Future  - VITAMIN B12; Future  - FOLATE; Future    4. Jet lag  - Discussed the risks and side effects of Xanax with patient including risk of dependency and recommended to rare use only.  I have reviewed the Prescription Monitoring Program () today before refilling medication. No concerns on  review today.  - Prescribed Xanax 0.5 mg to take 0.5-1 tab nightly as needed for sleep.  - ALPRAZolam (XANAX) 0.5 MG Tab; Take 1 Tab by mouth at bedtime as needed for Sleep or Anxiety for up to 30 days.  Dispense: 30 Tab; Refill: 0    5. Need for shingles vaccine  - Shingrix vaccine was given today after reviewing risks and benefits as well as side effects of vaccine.  - Zoster Vac Recomb Adjuvanted (SHINGRIX) 50 MCG Recon Susp; 0.5 mL by  Intramuscular route Once for 1 dose.  Dispense: 0.5 mL; Refill: 0      Followup: Return in about 6 months (around 9/14/2019), or if symptoms worsen or fail to improve, for Anemia, Hyperlipidemia, Osteopenia, Lab review.      Please note that this dictation was created using voice recognition software. I have made every reasonable attempt to correct obvious errors, but I expect that there may have unintended errors in text, spelling, punctuation, or grammar that I did not discover.

## 2019-03-15 NOTE — ASSESSMENT & PLAN NOTE
Patient has slightly anemic with elevated MCV on recent blood test.  Patient denied abdominal pain or black tarry stool or blood in urine.  She reported that she may take Aleve once every 2-3 months.  She is not taking any NSAIDs recently.  She stated that she drank a few cocktails in [place] during her vacation before her blood test.  She is taking vitamin B complex, vitamin C, vitamin D and vitamin K currently.

## 2019-03-15 NOTE — ASSESSMENT & PLAN NOTE
Patient has improvement on her cholesterol.  She tries to eat healthy diet and do regular physical exercise.  10-year risk of ASCVD score was 16.1%.  She declined to take statin or any prescription medication even after discussion of risks and benefits of treatment.  I reviewed blood test result with her in clinic today.    Results for KANWAL DICKEY (MRN [id number]) as of 3/14/2019 21:05   Ref. Range 12/7/2016 09:33 12/12/2017 08:47 3/12/2019 09:26   Cholesterol,Tot Latest Ref Range: 100 - 199 mg/dL 214 (H) 206 (H) 195   Triglycerides Latest Ref Range: 0 - 149 mg/dL 74 85 88   HDL Latest Ref Range: >=40 mg/dL 91 93 86   LDL Latest Ref Range: <100 mg/dL 108 (H) 96 91

## 2019-03-15 NOTE — ASSESSMENT & PLAN NOTE
Patient stated that she plans to travel to [place] in June.  She needs to have Xanax refill.  She stated that she wanted to take it for anxiety during her flight and to help her to sleep.  She reported having difficulty to fall asleep when she traveled.  She denies side effects from taking Xanax.

## 2019-03-15 NOTE — ASSESSMENT & PLAN NOTE
Patient has osteopenia with high risk fracture.  She declined to take prescription medication.  She preferred to continue vitamin D and calcium supplements and do weightbearing exercise regularly.  Her last DEXA scan on 2/16/18 showed increase in bone density on both lumbar and left femur.

## 2019-08-15 ENCOUNTER — TELEPHONE (OUTPATIENT)
Dept: MEDICAL GROUP | Age: 74
End: 2019-08-15

## 2019-08-15 NOTE — TELEPHONE ENCOUNTER
VOICEMAIL  1. Caller Name: Maryann Cardona                        Call Back Number: [phone] (home) [phone] (work)      2. Message: Pt return call & lvm. Called pt back. No answer, lvm: Need to reschedule appoint maria isabel/ 09/11/19 due to Dr. Lakhani out of office.     3. Patient approves office to leave a detailed voicemail/MyChart message: N\A

## 2019-08-20 NOTE — TELEPHONE ENCOUNTER
VOICEMAIL  1. Caller Name: Maryann Cardona                        Call Back Number: [phone] (home) [phone] (work)      2. Message: Pt returned call & lvm. Called & spoke to pt. Rescheduled appt from 09/11/19 to 09/19/19 @ 11:40AM. Advised pt 15 minute prior check-in. Pt is to complete labs 1 week prior to office visit w/Dr. Lakhani using [employer]. Pt is to speak to Dr. Lakhani @ upcoming visit about Mammo & Ultrasound.      3. Patient approves office to leave a detailed voicemail/MyChart message: N\A

## 2019-09-09 ENCOUNTER — TELEPHONE (OUTPATIENT)
Dept: MEDICAL GROUP | Age: 74
End: 2019-09-09

## 2019-09-09 NOTE — TELEPHONE ENCOUNTER
VOICEMAIL  1. Caller Name: Maryann Cardona                        Call Back Number: [phone] (home) [phone] (work)      2. Message: pt lvm. Returned call & spoke to pt. Per pt request changed appt. from 09/19/19 @ 11:20am to 11/27/19 @ 11:20AM -TF    3. Patient approves office to leave a detailed voicemail/MyChart message: N\A

## 2019-11-16 ENCOUNTER — APPOINTMENT (OUTPATIENT)
Dept: RADIOLOGY | Facility: IMAGING CENTER | Age: 74
End: 2019-11-16
Attending: PHYSICIAN ASSISTANT
Payer: MEDICARE

## 2019-11-16 ENCOUNTER — OFFICE VISIT (OUTPATIENT)
Dept: URGENT CARE | Facility: CLINIC | Age: 74
End: 2019-11-16
Payer: MEDICARE

## 2019-11-16 VITALS
RESPIRATION RATE: 12 BRPM | WEIGHT: 119 LBS | TEMPERATURE: 98.1 F | DIASTOLIC BLOOD PRESSURE: 70 MMHG | HEIGHT: 65 IN | SYSTOLIC BLOOD PRESSURE: 110 MMHG | OXYGEN SATURATION: 97 % | BODY MASS INDEX: 19.83 KG/M2 | HEART RATE: 67 BPM

## 2019-11-16 DIAGNOSIS — J06.9 URI WITH COUGH AND CONGESTION: ICD-10-CM

## 2019-11-16 DIAGNOSIS — J40 BRONCHITIS: ICD-10-CM

## 2019-11-16 DIAGNOSIS — H10.33 ACUTE BACTERIAL CONJUNCTIVITIS OF BOTH EYES: ICD-10-CM

## 2019-11-16 DIAGNOSIS — H66.001 NON-RECURRENT ACUTE SUPPURATIVE OTITIS MEDIA OF RIGHT EAR WITHOUT SPONTANEOUS RUPTURE OF TYMPANIC MEMBRANE: ICD-10-CM

## 2019-11-16 DIAGNOSIS — J40 BRONCHITIS: Primary | ICD-10-CM

## 2019-11-16 LAB
INT CON NEG: NORMAL
INT CON POS: NORMAL
S PYO AG THROAT QL: NEGATIVE

## 2019-11-16 PROCEDURE — 99214 OFFICE O/P EST MOD 30 MIN: CPT | Performed by: PHYSICIAN ASSISTANT

## 2019-11-16 PROCEDURE — 87880 STREP A ASSAY W/OPTIC: CPT | Performed by: PHYSICIAN ASSISTANT

## 2019-11-16 PROCEDURE — 71046 X-RAY EXAM CHEST 2 VIEWS: CPT | Mod: TC | Performed by: PHYSICIAN ASSISTANT

## 2019-11-16 RX ORDER — METHYLPREDNISOLONE 4 MG/1
TABLET ORAL
Qty: 21 TAB | Refills: 0 | Status: SHIPPED | OUTPATIENT
Start: 2019-11-16 | End: 2019-11-27

## 2019-11-16 RX ORDER — DEXTROMETHORPHAN HYDROBROMIDE AND PROMETHAZINE HYDROCHLORIDE 15; 6.25 MG/5ML; MG/5ML
5 SYRUP ORAL EVERY 4 HOURS PRN
Qty: 120 ML | Refills: 0 | Status: SHIPPED | OUTPATIENT
Start: 2019-11-16 | End: 2019-11-27

## 2019-11-16 RX ORDER — AMOXICILLIN AND CLAVULANATE POTASSIUM 875; 125 MG/1; MG/1
1 TABLET, FILM COATED ORAL 2 TIMES DAILY
Qty: 14 TAB | Refills: 0 | Status: SHIPPED | OUTPATIENT
Start: 2019-11-16 | End: 2019-11-23

## 2019-11-16 RX ORDER — POLYMYXIN B SULFATE AND TRIMETHOPRIM 1; 10000 MG/ML; [USP'U]/ML
1 SOLUTION OPHTHALMIC EVERY 4 HOURS
Qty: 10 ML | Refills: 0 | Status: SHIPPED | OUTPATIENT
Start: 2019-11-16 | End: 2019-11-27

## 2019-11-16 NOTE — PATIENT INSTRUCTIONS
Acute Bronchitis, Adult  Acute bronchitis is when air tubes (bronchi) in the lungs suddenly get swollen. The condition can make it hard to breathe. It can also cause these symptoms:  · A cough.  · Coughing up clear, yellow, or green mucus.  · Wheezing.  · Chest congestion.  · Shortness of breath.  · A fever.  · Body aches.  · Chills.  · A sore throat.  Follow these instructions at home:  Medicines  · Take over-the-counter and prescription medicines only as told by your doctor.  · If you were prescribed an antibiotic medicine, take it as told by your doctor. Do not stop taking the antibiotic even if you start to feel better.  General instructions  · Rest.  · Drink enough fluids to keep your pee (urine) clear or pale yellow.  · Avoid smoking and secondhand smoke. If you smoke and you need help quitting, ask your doctor. Quitting will help your lungs heal faster.  · Use an inhaler, cool mist vaporizer, or humidifier as told by your doctor.  · Keep all follow-up visits as told by your doctor. This is important.  How is this prevented?  To lower your risk of getting this condition again:  · Wash your hands often with soap and water. If you cannot use soap and water, use hand .  · Avoid contact with people who have cold symptoms.  · Try not to touch your hands to your mouth, nose, or eyes.  · Make sure to get the flu shot every year.  Contact a doctor if:  · Your symptoms do not get better in 2 weeks.  Get help right away if:  · You cough up blood.  · You have chest pain.  · You have very bad shortness of breath.  · You become dehydrated.  · You faint (pass out) or keep feeling like you are going to pass out.  · You keep throwing up (vomiting).  · You have a very bad headache.  · Your fever or chills gets worse.  This information is not intended to replace advice given to you by your health care provider. Make sure you discuss any questions you have with your health care provider.  Document Released: 06/05/2009  Document Revised: 07/26/2017 Document Reviewed: 06/07/2017  [employer] Interactive Patient Education © 2017 [employer]

## 2019-11-16 NOTE — PROGRESS NOTES
Subjective:      Pt is a 74 y.o. female who presents with Ear Fullness (right ear, something inside, putting pressure you can feel somthing inside x 2 months ); Headache (throbbing pain x 2 days ); and Cold Symptoms (chest congestion, sore throat x 2 days )            HPI  This is a new problem. PT presents to  clinic today complaining of sore throat, red eyes,  pressure in ears, cough, fatigue, runny nose, wheezing and SOB. PT denies CP, NVD, abdominal pain, joint pain. PT states these symptoms began around 3 days ago. PT states the pain is a 7/10 with coughing fits, aching in nature and worse at night. Pt has not taken any RX medications for this condition. The pt's medication list, problem list, and allergies have been evaluated and reviewed during today's visit.    PMH:  Past Medical History:   Diagnosis Date   • Anesthesia     positioning of c spine, sp epidurals   • Arrhythmia     Left BBB, asymptomatic   • CATARACT    • Glaucoma    • Other specified disorder of intestines     constipation   • Pain        PSH:  Past Surgical History:   Procedure Laterality Date   • PARATHYROID EXPLORATION  2/1/2012    Performed by RICKY CASEY at SURGERY SAME DAY [facility] ORS   • LUMBAR LAMINECTOMY DISKECTOMY  9/23/2011    Performed by JADEN WALL at SURGERY [facility] ORS   • LUMBAR DECOMPRESSION  9/23/2011    Performed by JADEN WALL at SURGERY [facility] ORS   • KNEE ARTHROSCOPY  10/21/2009    Performed by TRACI PADRON at SURGERY [facility] ORS   • MEDIAL MENISCECTOMY  10/21/2009    Performed by TRACI PADRON at SURGERY [facility] ORS   • ABDOMINOPLASTY  2003   • HYSTERECTOMY, VAGINAL  1999   • ROTATOR CUFF REPAIR  1999   • APPENDECTOMY  1967   • US-NEEDLE CORE BX-BREAST PANEL  2001?    right breast, benign       Fam Hx:    family history includes Cancer in her maternal aunt and another family member; Diabetes in an other family member; Hypertension in an other family member; Lung Disease in her father  and mother; Osteoporosis in her brother; Stroke in her mother and another family member.  Family Status   Relation Name Status   • Mo 83    • Fa 84    • Bro 73 Alive   • Hannah 52 Alive   • Hannah 50 Alive   • Hannah 49 Alive   • OTHER  (Not Specified)   • MAunt  (Not Specified)   • MGMo     • MGFa     • PGMo     • PGFa         Soc HX:  Social History     Socioeconomic History   • Marital status:      Spouse name: Not on file   • Number of children: Not on file   • Years of education: Not on file   • Highest education level: Not on file   Occupational History   • Not on file   Social Needs   • Financial resource strain: Not on file   • Food insecurity:     Worry: Not on file     Inability: Not on file   • Transportation needs:     Medical: Not on file     Non-medical: Not on file   Tobacco Use   • Smoking status: Never Smoker   • Smokeless tobacco: Never Used   Substance and Sexual Activity   • Alcohol use: Yes     Alcohol/week: 0.0 oz     Comment: occ   • Drug use: No   • Sexual activity: Not Currently   Lifestyle   • Physical activity:     Days per week: Not on file     Minutes per session: Not on file   • Stress: Not on file   Relationships   • Social connections:     Talks on phone: Not on file     Gets together: Not on file     Attends Hoahaoism service: Not on file     Active member of club or organization: Not on file     Attends meetings of clubs or organizations: Not on file     Relationship status: Not on file   • Intimate partner violence:     Fear of current or ex partner: Not on file     Emotionally abused: Not on file     Physically abused: Not on file     Forced sexual activity: Not on file   Other Topics Concern   • Not on file   Social History Narrative   • Not on file         Medications:    Current Outpatient Medications:   •  polymixin-trimethoprim (POLYTRIM) 70778-1.1 UNIT/ML-% Solution, Place 1 Drop in both eyes every 4 hours., Disp: 10 mL, Rfl:  0  •  amoxicillin-clavulanate (AUGMENTIN) 875-125 MG Tab, Take 1 Tab by mouth 2 times a day for 7 days., Disp: 14 Tab, Rfl: 0  •  methylPREDNISolone (MEDROL DOSEPAK) 4 MG Tablet Therapy Pack, Follow schedule on package instructions., Disp: 21 Tab, Rfl: 0  •  promethazine-dextromethorphan (PROMETHAZINE-DM) 6.25-15 MG/5ML syrup, Take 5 mL by mouth every four hours as needed., Disp: 120 mL, Rfl: 0  •  Ascorbic Acid (VITAMIN C) 1000 MG Tab, Take  by mouth., Disp: , Rfl:   •  VITAMIN K PO, Take  by mouth., Disp: , Rfl:   •  Zinc 50 MG Cap, Take 50 mg by mouth every day., Disp: , Rfl:   •  B Complex Vitamins (VITAMIN B COMPLEX PO), Take  by mouth., Disp: , Rfl:   •  Cholecalciferol (VITAMIN D) 2000 UNIT Tab, Take  by mouth every day., Disp: , Rfl:   •  Naproxen Sodium (ALEVE PO), Take  by mouth., Disp: , Rfl:   •  Brimonidine Tartrate (ALPHAGAN P) 0.1 % SOLN, 1 Drop by Ophthalmic route 2 Times a Day., Disp: , Rfl:   •  dorzolamide-timolol (COSOPT) 22.3-6.8 MG/ML SOLN, Place 1 Drop in both eyes 2 times a day., Disp: , Rfl:   •  latanoprost (XALATAN) 0.005 % SOLN, Place 1 Drop in both eyes every evening., Disp: , Rfl:       Allergies:  Ofloxacin; Vicodin [hydrocodone-acetaminophen]; and Ambien [zolpidem tartrate]    ROS  Review of Systems   Constitutional: Positive for malaise/fatigue. Negative for fever and diaphoresis.   HENT: Positive for congestion and sore throat. Negative for ear discharge, hearing loss, nosebleeds and tinnitus.    Eyes: Negative for blurred vision, double vision and photophobia. +redness B/L  Respiratory: Positive for cough, sputum production, shortness of breath and wheezing. Negative for hemoptysis.    Cardiovascular: Negative for chest pain and palpitations.   Gastrointestinal: Negative for nausea, vomiting, abdominal pain, diarrhea and constipation.   Genitourinary: Negative for dysuria and flank pain.   Musculoskeletal: Negative for joint pain and myalgias.   Skin: Negative for itching and rash.    Neurological:  Negative for dizziness, tingling and weakness.   Endo/Heme/Allergies: Does not bruise/bleed easily.   Psychiatric/Behavioral: Negative for depression. The patient is not nervous/anxious.             Objective:     There were no vitals filed for this visit.    Physical Exam      Physical Exam   Constitutional: PT is oriented to person, place, and time. PT appears well-developed and well-nourished. No distress.   HENT:   Head: Normocephalic and atraumatic.   Right Ear: Hearing, external ear and ear canal normal. Tympanic membrane is erythematous and bulging. A middle ear effusion is present.   Left Ear: Hearing, tympanic membrane, external ear and ear canal normal.   Nose: Mucosal edema, rhinorrhea and sinus tenderness present. Right sinus exhibits frontal sinus tenderness. Left sinus exhibits frontal sinus tenderness.   Mouth/Throat: Uvula is midline. Mucous membranes are pale. Posterior oropharyngeal edema and posterior oropharyngeal erythema present. No oropharyngeal exudate.   Eyes: Conjunctivae injected and EOM are normal. Pupils are equal, round, and reactive to light. Right eye exhibits discharge. Left eye exhibits discharge.  Neck: Normal range of motion. Neck supple. No thyromegaly present.   Cardiovascular: Normal rate, regular rhythm, normal heart sounds and intact distal pulses.  Exam reveals no gallop and no friction rub.    No murmur heard.  Pulmonary/Chest: Effort normal. No respiratory distress. PT has wheezes. PT has no rales. PT exhibits tenderness.   Abdominal: Soft. Bowel sounds are normal. PT exhibits no distension and no mass. There is no tenderness. There is no rebound and no guarding.   Musculoskeletal: Normal range of motion. PT exhibits no edema and no tenderness.   Lymphadenopathy:     PT has no cervical adenopathy.   Neurological: Pt is alert and oriented to person, place, and time. Pt has normal reflexes. No cranial nerve deficit.   Skin: Skin is warm and dry. No rash  noted. No erythema.   Psychiatric: PT has a normal mood and affect. Pt behavior is normal. Judgment and thought content normal.     RADS:  DX-CHEST-2 VIEWS   Order: [id number]   Status:  Final result   Visible to patient:  No (Not Released)   Next appt:  11/27/2019 at 11:20 AM [facility] (Angela Lakhani M.D.)   Dx:  Bronchitis   Details     Reading Physician Reading Date Result Priority   Deacon Miller M.D. 11/16/2019 Urgent Care      Narrative & Impression        11/16/2019 2:04 PM     HISTORY/REASON FOR EXAM:  Cough     TECHNIQUE/EXAM DESCRIPTION:  PA and lateral views of the chest.     COMPARISON:  9/20/2011     FINDINGS:     The heart is not enlarged. No focal consolidation, pleural effusion or pneumothorax is identified.  Costophrenic angles are clear. Rotator cuff anchor is seen projecting over the right humeral head. Degenerative changes are seen in the spine. There is   minimal linear retrocardiac atelectasis. Mild wedge deformity in the lower thoracic spine is likely chronic.     IMPRESSION:     Minimal retrocardiac atelectasis.     Mild wedge deformity in the lower thoracic spine is likely chronic.                  Last Resulted: 11/16/19  2:19 PM                Assessment/Plan:       1. Bronchitis    - DX-CHEST-2 VIEWS; Future  - POCT Rapid Strep A-->NEG  - amoxicillin-clavulanate (AUGMENTIN) 875-125 MG Tab; Take 1 Tab by mouth 2 times a day for 7 days.  Dispense: 14 Tab; Refill: 0  - methylPREDNISolone (MEDROL DOSEPAK) 4 MG Tablet Therapy Pack; Follow schedule on package instructions.  Dispense: 21 Tab; Refill: 0    2. URI with cough and congestion  - methylPREDNISolone (MEDROL DOSEPAK) 4 MG Tablet Therapy Pack; Follow schedule on package instructions.  Dispense: 21 Tab; Refill: 0  - promethazine-dextromethorphan (PROMETHAZINE-DM) 6.25-15 MG/5ML syrup; Take 5 mL by mouth every four hours as needed.  Dispense: 120 mL; Refill: 0    3. Non-recurrent acute suppurative otitis media of right ear  without spontaneous rupture of tympanic membrane    - methylPREDNISolone (MEDROL DOSEPAK) 4 MG Tablet Therapy Pack; Follow schedule on package instructions.  Dispense: 21 Tab; Refill: 0    4. Acute bacterial conjunctivitis of both eyes    - polymixin-trimethoprim (POLYTRIM) 33174-0.1 UNIT/ML-% Solution; Place 1 Drop in both eyes every 4 hours.  Dispense: 10 mL; Refill: 0  - methylPREDNISolone (MEDROL DOSEPAK) 4 MG Tablet Therapy Pack; Follow schedule on package instructions.  Dispense: 21 Tab; Refill: 0    Concern for worsening symptoms of URI which shortly could transition to pneumonia and worsening sinus congestion and infection with powerful cough keeping pt up at night as they must sleep upright to avoid coughing fits.  Diff DX: Bronchitis, Sinusitis, Pneumonia, Influenza, Viral URI, Allergies  Rest, fluids encouraged.  OTC decongestant for congestion/cough  AVS with medical info given.  Pt was in full understanding and agreement with the plan.  Differential diagnosis, natural history, supportive care, and indications for immediate follow-up discussed. All questions answered. Patient agrees with the plan of care.  Follow-up as needed if symptoms worsen or fail to improve to PCP, Urgent care or Emergency Room.

## 2019-11-22 ENCOUNTER — HOSPITAL ENCOUNTER (OUTPATIENT)
Dept: LAB | Facility: MEDICAL CENTER | Age: 74
End: 2019-11-22
Attending: INTERNAL MEDICINE
Payer: MEDICARE

## 2019-11-22 DIAGNOSIS — E78.00 HYPERCHOLESTEROLEMIA: ICD-10-CM

## 2019-11-22 DIAGNOSIS — M85.80 OSTEOPENIA WITH HIGH RISK OF FRACTURE: ICD-10-CM

## 2019-11-22 DIAGNOSIS — D53.9 MACROCYTIC ANEMIA: ICD-10-CM

## 2019-11-22 LAB
ALBUMIN SERPL BCP-MCNC: 4.2 G/DL (ref 3.2–4.9)
ALBUMIN/GLOB SERPL: 1.8 G/DL
ALP SERPL-CCNC: 74 U/L (ref 30–99)
ALT SERPL-CCNC: 14 U/L (ref 2–50)
ANION GAP SERPL CALC-SCNC: 8 MMOL/L (ref 0–11.9)
AST SERPL-CCNC: 27 U/L (ref 12–45)
BASOPHILS # BLD AUTO: 1.4 % (ref 0–1.8)
BASOPHILS # BLD: 0.1 K/UL (ref 0–0.12)
BILIRUB SERPL-MCNC: 0.3 MG/DL (ref 0.1–1.5)
BUN SERPL-MCNC: 21 MG/DL (ref 8–22)
CALCIUM SERPL-MCNC: 9.8 MG/DL (ref 8.5–10.5)
CHLORIDE SERPL-SCNC: 104 MMOL/L (ref 96–112)
CHOLEST SERPL-MCNC: 173 MG/DL (ref 100–199)
CO2 SERPL-SCNC: 24 MMOL/L (ref 20–33)
CREAT SERPL-MCNC: 0.77 MG/DL (ref 0.5–1.4)
EOSINOPHIL # BLD AUTO: 0.53 K/UL (ref 0–0.51)
EOSINOPHIL NFR BLD: 7.2 % (ref 0–6.9)
ERYTHROCYTE [DISTWIDTH] IN BLOOD BY AUTOMATED COUNT: 45.3 FL (ref 35.9–50)
FOLATE SERPL-MCNC: >22.4 NG/ML
GLOBULIN SER CALC-MCNC: 2.4 G/DL (ref 1.9–3.5)
GLUCOSE SERPL-MCNC: 81 MG/DL (ref 65–99)
HCT VFR BLD AUTO: 42.9 % (ref 37–47)
HDLC SERPL-MCNC: 69 MG/DL
HGB BLD-MCNC: 13.8 G/DL (ref 12–16)
IMM GRANULOCYTES # BLD AUTO: 0.04 K/UL (ref 0–0.11)
IMM GRANULOCYTES NFR BLD AUTO: 0.5 % (ref 0–0.9)
LDLC SERPL CALC-MCNC: 88 MG/DL
LYMPHOCYTES # BLD AUTO: 2.36 K/UL (ref 1–4.8)
LYMPHOCYTES NFR BLD: 32.2 % (ref 22–41)
MCH RBC QN AUTO: 32.1 PG (ref 27–33)
MCHC RBC AUTO-ENTMCNC: 32.2 G/DL (ref 33.6–35)
MCV RBC AUTO: 99.8 FL (ref 81.4–97.8)
MONOCYTES # BLD AUTO: 0.76 K/UL (ref 0–0.85)
MONOCYTES NFR BLD AUTO: 10.4 % (ref 0–13.4)
NEUTROPHILS # BLD AUTO: 3.54 K/UL (ref 2–7.15)
NEUTROPHILS NFR BLD: 48.3 % (ref 44–72)
NRBC # BLD AUTO: 0 K/UL
NRBC BLD-RTO: 0 /100 WBC
PLATELET # BLD AUTO: 383 K/UL (ref 164–446)
PMV BLD AUTO: 10.1 FL (ref 9–12.9)
POTASSIUM SERPL-SCNC: 4.2 MMOL/L (ref 3.6–5.5)
PROT SERPL-MCNC: 6.6 G/DL (ref 6–8.2)
RBC # BLD AUTO: 4.3 M/UL (ref 4.2–5.4)
SODIUM SERPL-SCNC: 136 MMOL/L (ref 135–145)
TRIGL SERPL-MCNC: 81 MG/DL (ref 0–149)
VIT B12 SERPL-MCNC: >1500 PG/ML (ref 211–911)
WBC # BLD AUTO: 7.3 K/UL (ref 4.8–10.8)

## 2019-11-22 PROCEDURE — 82746 ASSAY OF FOLIC ACID SERUM: CPT

## 2019-11-22 PROCEDURE — 82607 VITAMIN B-12: CPT

## 2019-11-22 PROCEDURE — 80061 LIPID PANEL: CPT

## 2019-11-22 PROCEDURE — 80053 COMPREHEN METABOLIC PANEL: CPT

## 2019-11-22 PROCEDURE — 85025 COMPLETE CBC W/AUTO DIFF WBC: CPT

## 2019-11-22 PROCEDURE — 36415 COLL VENOUS BLD VENIPUNCTURE: CPT

## 2019-11-27 ENCOUNTER — OFFICE VISIT (OUTPATIENT)
Dept: MEDICAL GROUP | Age: 74
End: 2019-11-27
Payer: MEDICARE

## 2019-11-27 VITALS
HEIGHT: 70 IN | HEART RATE: 64 BPM | TEMPERATURE: 98.1 F | SYSTOLIC BLOOD PRESSURE: 98 MMHG | DIASTOLIC BLOOD PRESSURE: 52 MMHG | BODY MASS INDEX: 17.52 KG/M2 | OXYGEN SATURATION: 96 % | WEIGHT: 122.4 LBS

## 2019-11-27 DIAGNOSIS — E78.00 HYPERCHOLESTEROLEMIA: ICD-10-CM

## 2019-11-27 DIAGNOSIS — M85.80 OSTEOPENIA WITH HIGH RISK OF FRACTURE: ICD-10-CM

## 2019-11-27 DIAGNOSIS — R63.6 UNDERWEIGHT: ICD-10-CM

## 2019-11-27 PROCEDURE — 99214 OFFICE O/P EST MOD 30 MIN: CPT | Performed by: INTERNAL MEDICINE

## 2019-11-27 SDOH — HEALTH STABILITY: MENTAL HEALTH: HOW OFTEN DO YOU HAVE A DRINK CONTAINING ALCOHOL?: MONTHLY OR LESS

## 2019-11-27 SDOH — HEALTH STABILITY: MENTAL HEALTH: HOW MANY STANDARD DRINKS CONTAINING ALCOHOL DO YOU HAVE ON A TYPICAL DAY?: 1 OR 2

## 2019-11-27 SDOH — HEALTH STABILITY: MENTAL HEALTH: HOW OFTEN DO YOU HAVE 6 OR MORE DRINKS ON ONE OCCASION?: NEVER

## 2019-11-27 ASSESSMENT — PAIN SCALES - GENERAL: PAINLEVEL: NO PAIN

## 2019-11-27 NOTE — PROGRESS NOTES
"Subjective:   Maryann Cardona is a 74 y.o. female here today for evaluation and management of:    Acute. Patient was seen at [facility] on 11/16/19 for 3 days of sore throat, red eyes, ear pressure, cough, fatigue, rhinorrhea, wheezing, and SOB. CXR at that time (11/16/19) was benign and significant for \"minimal retrocardiac atelectasis and mild wedge deformity in the lower thoracic spine which is likely chronic\". In Urgent Care patient was diagnosed with bronchitis, URI, acute suppurative otitis media of right ear, and acute bacterial conjunctivitis of both eyes and was treated with 7 day course of amoxicillin-clavulanate 875-125 mg BID, Medrol Dosepak, promethazine-dextromethorphan 6.25-15 mg/5mL 5 mL Q4H PRN, and polymixin-trimethoprim eye solution. Today she reports feeling greatly improved. Eosinophils were mildly elevated on 11/22/19 at 7.20.  She denies cough or chest congestion or shortness of breath or wheezing or fever or chills.    Hypercholesterolemia  Chronic. Patient is attempting to control through diet and exercise alone, no pharmacological treatment. She denies any chest pain or claudication. Today we reviewed blood work from 11/22/19 which was normal with tot 173; tri 81; HDL 69; LDL 88. The 10-year ASCVD risk score (Patt DC Jr., et al., 2013) is: 9.8%.    I reviewed recent blood work with the patient in clinic today.   Ref. Range 3/12/2019 09:26 11/22/2019 08:05   Cholesterol,Tot Latest Ref Range: 100 - 199 mg/dL 195 173   Triglycerides Latest Ref Range: 0 - 149 mg/dL 88 81   HDL Latest Ref Range: >=40 mg/dL 86 69   LDL Latest Ref Range: <100 mg/dL 91 88     Underweight  Chronic. Patient reports difficulty gaining weight and reports she has always been underweight. She reports some minor weight loss during her recent illness. She reports she is on a program at the gym to help with muscle building and weight gain. She reports eating regularly and eat healthy diet.    Osteopenia with high " "risk of fracture  Chronic. Patient continues to decline prescription medication instead preferring vitamin D and calcium supplements. She reports regularly doing weightbearing exercises. Most recent DEXA completed 2/16/18 compared to 1/28/16 showed 5.2% increase in bone mineral density of the lumbar spine and 4.1% increase in the bone mineral density of the left femur. 2/16/18 10 year probability of fracture was 34.8%. She regularly follows with Dr. Kelsey Souza (Endocrinology).      Current medicines (including changes today)  Current Outpatient Medications   Medication Sig Dispense Refill   • Ascorbic Acid (VITAMIN C) 1000 MG Tab Take  by mouth.     • VITAMIN K PO Take  by mouth.     • Zinc 50 MG Cap Take 50 mg by mouth every day.     • B Complex Vitamins (VITAMIN B COMPLEX PO) Take  by mouth.     • Cholecalciferol (VITAMIN D) 2000 UNIT Tab Take  by mouth every day.     • Naproxen Sodium (ALEVE PO) Take  by mouth.     • Brimonidine Tartrate (ALPHAGAN P) 0.1 % SOLN 1 Drop by Ophthalmic route 2 Times a Day.     • dorzolamide-timolol (COSOPT) 22.3-6.8 MG/ML SOLN Place 1 Drop in both eyes 2 times a day.     • latanoprost (XALATAN) 0.005 % SOLN Place 1 Drop in both eyes every evening.       No current facility-administered medications for this visit.      She  has a past medical history of Anesthesia, Arrhythmia, CATARACT, Glaucoma, Other specified disorder of intestines, and Pain.    ROS   No chest pain, no shortness of breath, no abdominal pain       Objective:     BP (!) 98/52 (BP Location: Left arm, Patient Position: Sitting, BP Cuff Size: Adult)   Pulse 64   Temp 36.7 °C (98.1 °F) (Temporal)   Ht 1.778 m (5' 10\")   Wt 55.5 kg (122 lb 6.4 oz)   SpO2 96%  Body mass index is 17.56 kg/m².   Physical Exam:  General: Alert, oriented and no acute distress.  Eye contact is good, speech goal directed, affect calm  HEENT: conjunctiva non-injected, sclera non-icteric.  Oral mucous membranes pink and moist with no " lesions.  Pinna normal. TM pearly gray, no signs of infection on both ears. Mild, non-obstructing cerumen present in right ear canal.  Neck No supraclavicular, submandibular, submental lymphadenopathy or masses in the neck or supraclavicular regions.  Lungs: Normal respiratory effort, clear to auscultation bilaterally with good excursion.  CV: regular rate and rhythm. No murmurs.   Abdomen: soft, non distended, nontender, Bowel sound normal.  Ext: no edema, color normal, vascularity normal, temperature normal  Musculoskeletal exam: moving all extremities freely      Assessment and Plan:   The following treatment plan was discussed:    1. Hypercholesterolemia  - Stable, normal levels, not on medication.  ASCVD score at 9.8%.  Patient declined to take statin or any prescription medication. Advised to eat low fat, low carbohydrate and high fiber diet as well as do cardio physical exercise regularly.   - Plan to continue to monitor with blood work, which will be ordered and reviewed by her new PCP.    2. Underweight  - Patient is chronically underweight. She is counseled on a balanced diet to assist with weight gain.  I discussed balanced nutrition and also healthy snack between meals. She is additionally counseled on regular weight bearing exercises.    3. Osteopenia with high risk of fracture  - Patient continues to decline prescription medication. She prefers to continue daily calcium and vitamin D supplements. I reviewed potential risks, benefits, and side effects of these medications with the patient in clinic today.  - Plan to repeat DEXA scan 02/2020 which will be ordered by her new PCP.    4. Health Maintenance   - Patient is due for PPSV-23 vaccine however due to clinic shortage is unable to receive in clinic today. She is advised to consider receiving in local pharmacy or at next follow up appointment.   - Patient is due for AWV which she is encouraged to schedule with her new PCP.  - Mammogram was last  completed and normal 8/10/18. She elects to postpone repeat mammogram at this time. She denies acute concerns.     Follow up: Return in about 8 weeks (around 1/22/2020), or if symptoms worsen or fail to improve, for Osteopenia, under weight, hyperlipidemia.    ILeilani (Scribe), am scribing for, and in the presence of, Angela Lakhani M.D.. Electronically signed by: Leilani Pinedo ([place]us[place]ose), 11/27/2019. I, Angela Lakhani M.D., personally performed the services described in this documentation, as scribed by Leilani Pinedo in my presence, and it is both accurate and complete.    Please note that this dictation was created using voice recognition software. I have made every reasonable attempt to correct obvious errors, but I expect that there may have unintended errors in text, spelling, punctuation, or grammar that I did not discover.

## 2020-01-20 ENCOUNTER — OFFICE VISIT (OUTPATIENT)
Dept: MEDICAL GROUP | Age: 75
End: 2020-01-20
Payer: MEDICARE

## 2020-01-20 VITALS
OXYGEN SATURATION: 92 % | HEART RATE: 64 BPM | SYSTOLIC BLOOD PRESSURE: 110 MMHG | DIASTOLIC BLOOD PRESSURE: 70 MMHG | TEMPERATURE: 98 F | BODY MASS INDEX: 17.72 KG/M2 | WEIGHT: 123.8 LBS | HEIGHT: 70 IN

## 2020-01-20 DIAGNOSIS — Z86.39 HISTORY OF HYPERPARATHYROIDISM: ICD-10-CM

## 2020-01-20 DIAGNOSIS — E78.00 HYPERCHOLESTEROLEMIA: ICD-10-CM

## 2020-01-20 DIAGNOSIS — Z78.0 MENOPAUSE: ICD-10-CM

## 2020-01-20 DIAGNOSIS — M85.80 OSTEOPENIA WITH HIGH RISK OF FRACTURE: ICD-10-CM

## 2020-01-20 DIAGNOSIS — D53.9 MACROCYTIC ANEMIA: ICD-10-CM

## 2020-01-20 DIAGNOSIS — Z12.31 ENCOUNTER FOR SCREENING MAMMOGRAM FOR BREAST CANCER: ICD-10-CM

## 2020-01-20 DIAGNOSIS — Z23 NEED FOR VACCINATION: ICD-10-CM

## 2020-01-20 DIAGNOSIS — E55.9 VITAMIN D DEFICIENCY: ICD-10-CM

## 2020-01-20 PROBLEM — G47.25 JET LAG: Status: RESOLVED | Noted: 2017-04-13 | Resolved: 2020-01-20

## 2020-01-20 PROCEDURE — 90732 PPSV23 VACC 2 YRS+ SUBQ/IM: CPT | Performed by: INTERNAL MEDICINE

## 2020-01-20 PROCEDURE — G0009 ADMIN PNEUMOCOCCAL VACCINE: HCPCS | Performed by: INTERNAL MEDICINE

## 2020-01-20 PROCEDURE — 99214 OFFICE O/P EST MOD 30 MIN: CPT | Mod: 25 | Performed by: INTERNAL MEDICINE

## 2020-01-20 ASSESSMENT — PATIENT HEALTH QUESTIONNAIRE - PHQ9: CLINICAL INTERPRETATION OF PHQ2 SCORE: 0

## 2020-01-20 ASSESSMENT — ENCOUNTER SYMPTOMS
CONSTITUTIONAL NEGATIVE: 1
CARDIOVASCULAR NEGATIVE: 1
RESPIRATORY NEGATIVE: 1
NEUROLOGICAL NEGATIVE: 1
GASTROINTESTINAL NEGATIVE: 1
EYES NEGATIVE: 1
MUSCULOSKELETAL NEGATIVE: 1
PSYCHIATRIC NEGATIVE: 1

## 2020-01-20 NOTE — PROGRESS NOTES
Subjective:      Maryann Cardona is a 75 y.o. female who presents with Establish Care (New patient) and Immunizations (PPSV23)        HPI    The patient is here for followup of chronic medical problems listed below. The patient is compliant with medications and having no side effects from them. Denies chest pain, abdominal pain, dyspnea, myalgias, or cough.    1. Hypercholesterolemia  The patient has a chronic history of hypercholesterolemia. Currently managing with lifestyle and dietary changes. No acute complaints of myalgias, abdominal pain, dizziness, claudication or chest pain. Lipid panel was last completed on 11/22/19 as shown below.    Results for MARYANN CARDONA (MRN [id number]) as of 1/20/2020 14:25   Ref. Range 11/22/2019 08:05   Cholesterol,Tot Latest Ref Range: 100 - 199 mg/dL 173   Triglycerides Latest Ref Range: 0 - 149 mg/dL 81   HDL Latest Ref Range: >=40 mg/dL 69   LDL Latest Ref Range: <100 mg/dL 88       2. Macrocytic anemia  Chronic history. The patient does not report any complications or exacerbations during today's visit.    3. History of hyperparathyroidism  Chronic history. The patient does not report any complications or exacerbations during today's visit. She had a parathyroid exploration surgery on 02/01/12.    4. Osteopenia with high risk of fracture  Osteopenia       Chronic history. The patient does not report any complications or exacerbations during today's visit.She continues to take OTC calcium and Vitamin D supplementation for management of this issue. Her last bone density screening was in 02/16/18.     5. Need for vaccination  The patient received her influenza vaccination this season. She comes requesting a pneumonia vaccine.    6. Encounter for screening mammogram for breast cancer  The patient verbalizes need for a mammogram. She has put it back so she can have her bone density assessed on the same day.    7. Menopause  The patient received her influenza vaccination this  "season. She comes requesting a pneumonia vaccine.     8. Vitamin D deficiency  Patient has a history of vitamin D deficiency and is taking 2,000 units. Vitamin D level was last evaluated on 03/12/19.    Patient Active Problem List   Diagnosis   • Osteopenia with high risk of fracture   • Primary open angle glaucoma of both eyes, indeterminate stage   • History of hyperparathyroidism   • Hypercholesterolemia   • Macrocytic anemia       Outpatient Medications Prior to Visit   Medication Sig Dispense Refill   • Ascorbic Acid (VITAMIN C) 1000 MG Tab Take  by mouth.     • VITAMIN K PO Take  by mouth.     • Zinc 50 MG Cap Take 50 mg by mouth every day.     • B Complex Vitamins (VITAMIN B COMPLEX PO) Take  by mouth.     • Cholecalciferol (VITAMIN D) 2000 UNIT Tab Take  by mouth every day.     • Naproxen Sodium (ALEVE PO) Take  by mouth.     • Brimonidine Tartrate (ALPHAGAN P) 0.1 % SOLN 1 Drop by Ophthalmic route 2 Times a Day.     • dorzolamide-timolol (COSOPT) 22.3-6.8 MG/ML SOLN Place 1 Drop in both eyes 2 times a day.     • latanoprost (XALATAN) 0.005 % SOLN Place 1 Drop in both eyes every evening.       No facility-administered medications prior to visit.         Allergies   Allergen Reactions   • Ofloxacin Shortness of Breath, Rash and Swelling     Severe facial and neck swelling   • Vicodin [Hydrocodone-Acetaminophen] Anxiety   • Ambien [Zolpidem Tartrate]      hallucinations       Review of Systems   Constitutional: Negative.    HENT: Negative.    Eyes: Negative.    Respiratory: Negative.    Cardiovascular: Negative.    Gastrointestinal: Negative.    Genitourinary: Negative.    Musculoskeletal: Negative.    Skin: Negative.    Neurological: Negative.    Endo/Heme/Allergies: Negative.    Psychiatric/Behavioral: Negative.       Objective:     /70 (BP Location: Left arm, Patient Position: Sitting, BP Cuff Size: Adult)   Pulse 64   Temp 36.7 °C (98 °F) (Temporal)   Ht 1.778 m (5' 10\")   Wt 56.2 kg (123 lb 12.8 " oz)   SpO2 92%   Breastfeeding? No   BMI 17.76 kg/m²     Physical Exam   Constitutional: Oriented to person, place, and time. Appears well-developed and well-nourished. No distress.   Head: Normocephalic and atraumatic.   Right Ear: External ear normal.   Left Ear: External ear normal.   Nose: Nose normal.   Mouth/Throat: Oropharynx is clear and moist. No oropharyngeal exudate.   Eyes: Pupils are equal, round, and reactive to light. Conjunctivae and EOM are normal. Right eye exhibits no discharge. Left eye exhibits no discharge. No scleral icterus.   Neck: Normal range of motion. Neck supple. No JVD present. No tracheal deviation present. No thyromegaly present.   Cardiovascular: Normal rate, regular rhythm, normal heart sounds and intact distal pulses.  Exam reveals no gallop and no friction rub.    No murmur heard.  Pulmonary/Chest: Effort normal. No stridor. No respiratory distress. No wheezing or rales. No tenderness.   Abdominal: Soft. Bowel sounds are normal. No distension and no mass. There is no tenderness. There is no rebound and no guarding. No hernia.   Musculoskeletal: Normal range of motion No edema or tenderness.   Lymphadenopathy: No cervical adenopathy.   Neurological: Alert and oriented to person, place, and time. Normal reflexes. Normal reflexes. No cranial nerve deficit. Normal muscle tone. Coordination normal.   Skin: Skin is warm and dry. No rash noted. Not diaphoretic. No erythema. No pallor.   Psychiatric: Normal mood and affect. Behavior is normal. Judgment and thought content normal.   Nursing note and vitals reviewed.      No results found for: HBA1C  Lab Results   Component Value Date/Time    SODIUM 136 11/22/2019 08:05 AM    POTASSIUM 4.2 11/22/2019 08:05 AM    CHLORIDE 104 11/22/2019 08:05 AM    CO2 24 11/22/2019 08:05 AM    GLUCOSE 81 11/22/2019 08:05 AM    BUN 21 11/22/2019 08:05 AM    CREATININE 0.77 11/22/2019 08:05 AM    CREATININE 0.7 09/04/2008 11:10 PM    BUNCREATRAT 21  10/27/2015 09:43 AM    ALKPHOSPHAT 74 11/22/2019 08:05 AM    ASTSGOT 27 11/22/2019 08:05 AM    ALTSGPT 14 11/22/2019 08:05 AM    TBILIRUBIN 0.3 11/22/2019 08:05 AM     Lab Results   Component Value Date/Time    INR 0.90 03/27/2016 07:43 PM    INR 1.14 05/09/2011 04:25 AM    INR 1.12 05/07/2011 04:00 AM     Lab Results   Component Value Date/Time    CHOLSTRLTOT 173 11/22/2019 08:05 AM    LDL 88 11/22/2019 08:05 AM    HDL 69 11/22/2019 08:05 AM    TRIGLYCERIDE 81 11/22/2019 08:05 AM       No results found for: TESTOSTERONE  Lab Results   Component Value Date/Time    TSH 1.090 10/27/2015 09:43 AM     Lab Results   Component Value Date/Time    FREET4 0.79 12/07/2016 09:38 AM    FREET4 1.05 05/07/2011 04:00 AM     No results found for: URICACID  No components found for: VITB12  Lab Results   Component Value Date/Time    25HYDROXY 68 03/12/2019 09:26 AM    25HYDROXY 71 03/12/2019 09:22 AM          Assessment/Plan:     1. Hypercholesterolemia  Well-controlled. Continue current regimen of dietary and lifestyle changes. Reviewed the risks and benefits of treatment and potential side effects of medication.  - Obtained and reviewed lipid panel dated 11/22/19 which reveals triglycerides of 81, HDL of 69, and LDL of 88.  - Recommended they follow low fat, low carbohydrate and high fiber diet. Additionally, patient was asked to exercise regularly including frequent cardio.  - Recheck lab 1-2 weeks before next follow up visit.  - Comp Metabolic Panel; Future  - Lipid Profile; Future  - CBC WITH DIFFERENTIAL; Future    2. Macrocytic anemia  Under good control. Continue same regimen.  - CBC WITH DIFFERENTIAL; Future  - VITAMIN B12; Future    3. History of hyperparathyroidism  Under good control. Continue same regimen.  - Comp Metabolic Panel; Future  - PTH INTACT (PTH ONLY); Future    4. Osteopenia with high risk of fracture  Under good control. Continue same regimen.    5. Need for vaccination  Vaccine was administered today  without adverse event.  - Pneumoccocal Polysaccharide Vaccine 23-Valent =>1yo SQ/IM    6. Encounter for screening mammogram for breast cancer  Will have mammogram done in future.  - MA-SCREEN MAMMO W/CAD-BILAT; Future    7. Menopause  Under good control. Continue same regimen.  - DS-BONE DENSITY STUDY (DEXA); Future    8. Vitamin D deficiency  Prior history of vitamin D deficiency. Plan to continue current treatment of 2,000 units of vitamin D. Repeat labs will be completed 1-2 weeks prior to their next follow up appointment for continued management.  - VITAMIN D,25 HYDROXY; Future            IAngel (Magnusibe), am scribing for, and in the presence of, Hardeep Kirkpatrick M.D..    Electronically signed by: Angel Aguilar (Reynaldo), 1/20/2020    IHardeep M.D., personally performed the services described in this documentation, as scribed by Angel Aguilar in my presence, and it is both accurate and complete.

## 2020-02-28 ENCOUNTER — HOSPITAL ENCOUNTER (OUTPATIENT)
Dept: RADIOLOGY | Facility: MEDICAL CENTER | Age: 75
End: 2020-02-28
Attending: INTERNAL MEDICINE
Payer: MEDICARE

## 2020-02-28 DIAGNOSIS — Z78.0 MENOPAUSE: ICD-10-CM

## 2020-02-28 DIAGNOSIS — Z12.31 ENCOUNTER FOR SCREENING MAMMOGRAM FOR BREAST CANCER: ICD-10-CM

## 2020-02-28 PROCEDURE — 77080 DXA BONE DENSITY AXIAL: CPT

## 2020-02-28 PROCEDURE — 77067 SCR MAMMO BI INCL CAD: CPT | Mod: 50

## 2020-03-10 ENCOUNTER — TELEPHONE (OUTPATIENT)
Dept: MEDICAL GROUP | Age: 75
End: 2020-03-10

## 2020-03-10 DIAGNOSIS — F51.01 PRIMARY INSOMNIA: ICD-10-CM

## 2020-03-11 NOTE — TELEPHONE ENCOUNTER
1. Caller Name: Maryann Cardona                        Call Back Number: [phone] (home) [phone] (work)        How would the patient prefer to be contacted with a response: Phone call do NOT leave a detailed message    Patient called stating she needs a referral to sleep studies, she states she is having a hard time sleeping and would like to see a specialist.

## 2020-06-04 ENCOUNTER — HOSPITAL ENCOUNTER (OUTPATIENT)
Dept: LAB | Facility: MEDICAL CENTER | Age: 75
End: 2020-06-04
Attending: INTERNAL MEDICINE
Payer: MEDICARE

## 2020-06-04 LAB
25(OH)D3 SERPL-MCNC: 68 NG/ML (ref 30–100)
ALBUMIN SERPL BCP-MCNC: 4.3 G/DL (ref 3.2–4.9)
ALBUMIN/GLOB SERPL: 1.8 G/DL
ALP SERPL-CCNC: 71 U/L (ref 30–99)
ALT SERPL-CCNC: 16 U/L (ref 2–50)
ANION GAP SERPL CALC-SCNC: 8 MMOL/L (ref 7–16)
AST SERPL-CCNC: 21 U/L (ref 12–45)
BILIRUB SERPL-MCNC: 0.5 MG/DL (ref 0.1–1.5)
BUN SERPL-MCNC: 21 MG/DL (ref 8–22)
CALCIUM SERPL-MCNC: 10 MG/DL (ref 8.5–10.5)
CHLORIDE SERPL-SCNC: 104 MMOL/L (ref 96–112)
CO2 SERPL-SCNC: 23 MMOL/L (ref 20–33)
CREAT SERPL-MCNC: 0.75 MG/DL (ref 0.5–1.4)
FASTING STATUS PATIENT QL REPORTED: NORMAL
GLOBULIN SER CALC-MCNC: 2.4 G/DL (ref 1.9–3.5)
GLUCOSE SERPL-MCNC: 86 MG/DL (ref 65–99)
POTASSIUM SERPL-SCNC: 4.2 MMOL/L (ref 3.6–5.5)
PROT SERPL-MCNC: 6.7 G/DL (ref 6–8.2)
SODIUM SERPL-SCNC: 135 MMOL/L (ref 135–145)
T4 FREE SERPL-MCNC: 1.18 NG/DL (ref 0.93–1.7)
TSH SERPL DL<=0.005 MIU/L-ACNC: 1.36 UIU/ML (ref 0.38–5.33)

## 2020-06-04 PROCEDURE — 82306 VITAMIN D 25 HYDROXY: CPT

## 2020-06-04 PROCEDURE — 36415 COLL VENOUS BLD VENIPUNCTURE: CPT

## 2020-06-04 PROCEDURE — 84443 ASSAY THYROID STIM HORMONE: CPT

## 2020-06-04 PROCEDURE — 80053 COMPREHEN METABOLIC PANEL: CPT

## 2020-06-04 PROCEDURE — 84439 ASSAY OF FREE THYROXINE: CPT

## 2020-06-04 PROCEDURE — 82523 COLLAGEN CROSSLINKS: CPT

## 2020-06-07 LAB — COLLAGEN CTX SERPL-MCNC: 584 PG/ML

## 2020-07-06 ENCOUNTER — SLEEP CENTER VISIT (OUTPATIENT)
Dept: SLEEP MEDICINE | Facility: MEDICAL CENTER | Age: 75
End: 2020-07-06
Payer: MEDICARE

## 2020-07-06 VITALS
WEIGHT: 121 LBS | HEIGHT: 70 IN | SYSTOLIC BLOOD PRESSURE: 130 MMHG | OXYGEN SATURATION: 97 % | DIASTOLIC BLOOD PRESSURE: 70 MMHG | HEART RATE: 65 BPM | RESPIRATION RATE: 16 BRPM | BODY MASS INDEX: 17.32 KG/M2

## 2020-07-06 DIAGNOSIS — F51.04 CHRONIC INSOMNIA: ICD-10-CM

## 2020-07-06 DIAGNOSIS — R06.83 SNORING: ICD-10-CM

## 2020-07-06 DIAGNOSIS — G47.30 SLEEP APNEA, UNSPECIFIED TYPE: ICD-10-CM

## 2020-07-06 DIAGNOSIS — G47.10 HYPERSOMNOLENCE: ICD-10-CM

## 2020-07-06 PROBLEM — E04.2 NON-TOXIC MULTINODULAR GOITER: Status: ACTIVE | Noted: 2020-07-06

## 2020-07-06 PROBLEM — E55.9 VITAMIN D DEFICIENCY: Status: ACTIVE | Noted: 2020-07-06

## 2020-07-06 PROBLEM — R53.83 FATIGUE: Status: ACTIVE | Noted: 2020-07-06

## 2020-07-06 PROCEDURE — 99204 OFFICE O/P NEW MOD 45 MIN: CPT | Performed by: INTERNAL MEDICINE

## 2020-07-06 RX ORDER — SUVOREXANT 10 MG/1
10 TABLET, FILM COATED ORAL NIGHTLY PRN
Qty: 30 TAB | Refills: 5 | Status: SHIPPED | OUTPATIENT
Start: 2020-07-06 | End: 2020-08-05

## 2020-07-06 ASSESSMENT — FIBROSIS 4 INDEX: FIB4 SCORE: 1.04

## 2020-07-06 NOTE — PROGRESS NOTES
CC: Snoring, non-refreshing sleep and chronic insomnia.    HPI:   Ms. Cardona is a 75-year-old woman referred by Dr. Hardeep Kirkpatrick to assist in evaluation and management of chronic insomnia and possible sleep disordered breathing.    She reports a history of difficulty initiating and maintaining sleep that is lasted for any years.  She has been treated with a number of medications including zolpidem and alprazolam which seem to produce morning grogginess even at low doses.  She has tried acupuncture as well as CBD and cannabinoids.  She is currently using over-the-counter melatonin.  She felt that the 5 mg dose left her groggy in the morning and is currently using half a tablet most nights.    She has a regular sleep schedule with bedtime at about 10:30 at night.  He does not use electronic screens in the bedroom but does read at bedtime.  There do not seem to be environmental problems in the bedroom such as excessive noise, light or temperature variations.  She does report an inability to relax with some racing thoughts.  Onset latency is difficult to quantify.  She typically awakens at 3-4 the morning and finds it very difficult to return to sleep.  She does occasionally awaken with nocturia.  She has been told that she does snore but we do not have an evaluation of possible cyclic breathing or overt apnea.  She is sleepy during the day and I have completed the Scurry sleepiness score.    In reviewing the principles of sleep hygiene she does seem to have a regular sleep-wake cycle and does not use excessive amounts of caffeine.  She does not nap during the day and does exercise regularly in the earlier part of the day.  She does not use alcohol to facilitate sleep onset.        Patient Active Problem List    Diagnosis Date Noted   • Macrocytic anemia 03/14/2019   • Hypercholesterolemia 04/13/2017   • History of hyperparathyroidism 12/13/2016   • Osteopenia with high risk of fracture 11/03/2015   • Primary open  angle glaucoma of both eyes, indeterminate stage 11/03/2015   • Primary insomnia 08/27/2015       Past Medical History:   Diagnosis Date   • Anesthesia     positioning of c spine, sp epidurals   • Arrhythmia     Left BBB, asymptomatic   • CATARACT    • Glaucoma    • Other specified disorder of intestines     constipation   • Pain        Past Surgical History:   Procedure Laterality Date   • PARATHYROID EXPLORATION  2/1/2012    Performed by RICKY CASEY at SURGERY SAME DAY [facility] ORS   • LUMBAR LAMINECTOMY DISKECTOMY  9/23/2011    Performed by JADEN WALL at SURGERY [facility] ORS   • LUMBAR DECOMPRESSION  9/23/2011    Performed by JADEN WALL at SURGERY [facility] ORS   • KNEE ARTHROSCOPY  10/21/2009    Performed by TRACI PADRON at SURGERY [facility] ORS   • MEDIAL MENISCECTOMY  10/21/2009    Performed by TRACI PADRON at SURGERY [facility]   • ABDOMINOPLASTY  2003   • HYSTERECTOMY, VAGINAL  1999   • ROTATOR CUFF REPAIR  1999   • APPENDECTOMY  1967   • US-NEEDLE CORE BX-BREAST PANEL  2001?    right breast, benign       Family History   Problem Relation Age of Onset   • Stroke Mother    • Lung Disease Mother    • Lung Disease Father    • Osteoporosis Brother    • Diabetes Other    • Hypertension Other    • Stroke Other    • Cancer Other    • Cancer Maternal Aunt         breast cancer       Social History     Socioeconomic History   • Marital status:      Spouse name: Not on file   • Number of children: Not on file   • Years of education: Not on file   • Highest education level: Not on file   Occupational History   • Not on file   Social Needs   • Financial resource strain: Not on file   • Food insecurity     Worry: Not on file     Inability: Not on file   • Transportation needs     Medical: Not on file     Non-medical: Not on file   Tobacco Use   • Smoking status: Never Smoker   • Smokeless tobacco: Never Used   Substance and Sexual Activity   • Alcohol use: Yes     Alcohol/week: 0.0 oz  "    Frequency: Monthly or less     Drinks per session: 1 or 2     Binge frequency: Never     Comment: occ   • Drug use: Not Currently     Types: Marijuana     Comment: tried it for sleep   • Sexual activity: Not Currently   Lifestyle   • Physical activity     Days per week: Not on file     Minutes per session: Not on file   • Stress: Not on file   Relationships   • Social connections     Talks on phone: Not on file     Gets together: Not on file     Attends Synagogue service: Not on file     Active member of club or organization: Not on file     Attends meetings of clubs or organizations: Not on file     Relationship status: Not on file   • Intimate partner violence     Fear of current or ex partner: Not on file     Emotionally abused: Not on file     Physically abused: Not on file     Forced sexual activity: Not on file   Other Topics Concern   • Not on file   Social History Narrative   • Not on file       Current Outpatient Medications   Medication Sig Dispense Refill   • Suvorexant (BELSOMRA) 10 MG Tab Take 10 mg by mouth at bedtime as needed (for insomnia) for up to 30 days. 30 Tab 5   • Ascorbic Acid (VITAMIN C) 1000 MG Tab Take  by mouth.     • VITAMIN K PO Take  by mouth.     • Zinc 50 MG Cap Take 50 mg by mouth every day.     • B Complex Vitamins (VITAMIN B COMPLEX PO) Take  by mouth.     • Cholecalciferol (VITAMIN D) 2000 UNIT Tab Take  by mouth every day.     • Naproxen Sodium (ALEVE PO) Take  by mouth.     • Brimonidine Tartrate (ALPHAGAN P) 0.1 % SOLN 1 Drop by Ophthalmic route 2 Times a Day.     • dorzolamide-timolol (COSOPT) 22.3-6.8 MG/ML SOLN Place 1 Drop in both eyes 2 times a day.     • latanoprost (XALATAN) 0.005 % SOLN Place 1 Drop in both eyes every evening.       No current facility-administered medications for this visit.     \"CURRENT RX\"      Allergies: Ofloxacin; Vicodin [hydrocodone-acetaminophen]; and Ambien [zolpidem tartrate]      ROS  Is a 10 for the sleep, endocrine, neurologic and " "ophthalmologic problems identified above as well as the items in the problem list and past medical history.  All other aspects of the CPT review of systems process are negative.      Physical Exam:   /70 (BP Location: Right arm, Patient Position: Sitting, BP Cuff Size: Adult)   Pulse 65   Resp 16   Ht 1.778 m (5' 10\")   Wt 54.9 kg (121 lb)   SpO2 97%   BMI 17.36 kg/m²    Added by coronavirus precautions.  She is a well-developed, well-nourished woman who is alert, oriented and appropriately responsive.  She does not appear dyspneic and is not coughing.  She is ambulatory without difficulty and is wearing a mask.      Problems:  1. Chronic insomnia  She has a long history of difficulty initiating and maintaining sleep.  She does not seem to have major issues with sleep hygiene.  She has tried a number of prescription and over-the-counter sleep agents remains on low-dose melatonin.  Talked about treatment options and particularly about cognitive behavioral therapy.  She is willing to explore that possibility.    2. Hypersomnolence  Probably related to use nightly sleep time and perhaps to sleep disordered breathing as well.    3. Snoring    4. Sleep apnea, unspecified type  Resents with snoring and excessive daytime somnolence.  The clinical probability of sleep apnea hypopnea syndrome is significant. Accurate diagnosis and effective treatment are required not only to improve levels of daytime alertness but also to reduce the cardiac and neurologic risks associated with untreated sleep apnea. We have discussed diagnostic options including in-laboratory, attended polysomnography and home sleep testing. We have also discussed treatment options including airway pressurization, reconstructive otolaryngologic surgery, dental appliances and weight management.      Plan:   1.  Polysomnogram, possible split-night study.    2.  Referral to Marjan Dumont, PhD, for evaluation and possible cognitive behavioral therapy " for insomnia.    3.  Pending the psychology evaluation we have talked about several sleep medications.  Recent data suggests that suvorexant may be full and sleep maintenance insomnia in older adults.  We have talked about the potential side effects of the medication including morning grogginess unusual nighttime behaviors with a possible fall risk.  She will begin suvorexant at 10 mg nightly as needed.  I anticipate this to be temporary therapy pending more definitive treatment of her chronic insomnia with cognitive behavioral therapy.    4.  Return visit after the sleep test to review results and treatment options.    We appreciate the opportunity to assist in her care.    Total time for the visit today was 45 minutes.  More than half of that time was devoted to counseling the patient concerning the mechanics of sleep disordered breathing, the benefits of treatment in  improving daytime sleepiness and reducing the risk of future cardiac and neurologic events and in discussing treatment options.

## 2020-08-11 ENCOUNTER — SLEEP STUDY (OUTPATIENT)
Dept: SLEEP MEDICINE | Facility: MEDICAL CENTER | Age: 75
End: 2020-08-11
Attending: INTERNAL MEDICINE
Payer: MEDICARE

## 2020-08-11 DIAGNOSIS — G47.30 SLEEP APNEA, UNSPECIFIED TYPE: ICD-10-CM

## 2020-08-11 DIAGNOSIS — G47.10 HYPERSOMNOLENCE: ICD-10-CM

## 2020-08-12 NOTE — PROCEDURES
Technical summary:The patient underwent a diagnostic polysomnogram. This was a 16 channel montage study to include a 6 channel EEG, a 2 channel EOG, and chin EMG, left and right leg EMG, a snore channel, a nasal pressure transducer, and a nasal oral airflow thermistor.   Respiratory effort was assessed with the use of a thoracic and abdominal monitor and overnight oximetry was obtained. Audio and video recordings were reviewed. This was a fully attended study and sleep stage scoring was performed. The test was technically adequate.       Scoring Criteria: A modification of the the AASM Manual for the Scoring of Sleep and Associated Events. Obstructive apnea was scored by cessation of airflow for at least 10 seconds with continuing respiratory effort.  Central apnea was scored by cessation of airflow for at least 10 seconds with no effort.  Hypopnea was scored by a 30% or more reduction in airflow for at least 10 seconds accompanied by an arterial oxygen desaturation of 3% or more.  (For Medicare patients, hypopneas were scored by a 30% or more reduction in airflow for at least 10 seconds accompanied by an arterial oxygen saturation of 4% or more, as required by their insurance, CMS.      Interpretation:  Study start time was 10:43:46 PM. Diagnostic recording time was 6h 55.0m with a total sleep time of 5h 36.0m resulting in a sleep efficiency of 80.96%%. Sleep latency from the start of the study was 09 minutes and the latency from sleep to REM was 93 minutes.In total,47 arousals were scored for an arousal index of 8.4. sleep stages shows normal SE< increased WASO of 70 min, normal N3 and decreased REM sleep.    Respiratory:  There were a total of 4 apneas consisting of 4 obstructive apneas, 0 mixed apneas, and 0 central apneas. A total of 2 hypopneas were scored.The apnea index was 0.71 per hour and the hypopnea index was 0.36 per hour resulting in an overall AHI of 1.07.AHI during rem was 3.1 and AHI while supine  was 1.98.    Oximetry:  There was a mean oxygen saturation of 96.0% with a minimum oxygen saturation of 92.0%. Time spent with oxygen saturations below 89% was 8.2 minutes.    Cardiac:  The highest heart rate seen while awake was 84 BPM while the highest heart rate during sleep was 72 BPM with an average sleeping heart rate of 52 BPM.    Limb Movements:  There were a total of 252 PLMs during sleep, of which 5 were PLMS arousals. This resulted in a PLMS index of 45.0 and a PLMS arousal index of 0.9.    Impression:  1.  Normal AHI of 1.1/hr and O2 jodi was 93 %  2.  Normal oximetry      Recommendations:  Clinical correlation is required. This study does not clinically suggest obstructive sleep apnea with a normal AHI of 1.1/hr and normal oximetry.  In general patients with daytime sleepiness are advised to avoid sedatives and do not operate a motor vehicle while drowsy.

## 2020-08-13 ENCOUNTER — TELEMEDICINE (OUTPATIENT)
Dept: SLEEP MEDICINE | Facility: MEDICAL CENTER | Age: 75
End: 2020-08-13
Payer: MEDICARE

## 2020-08-13 VITALS — BODY MASS INDEX: 18.32 KG/M2 | HEIGHT: 70 IN | WEIGHT: 128 LBS

## 2020-08-13 DIAGNOSIS — R53.83 FATIGUE, UNSPECIFIED TYPE: ICD-10-CM

## 2020-08-13 DIAGNOSIS — F51.04 CHRONIC INSOMNIA: ICD-10-CM

## 2020-08-13 DIAGNOSIS — G47.10 HYPERSOMNOLENCE: ICD-10-CM

## 2020-08-13 PROCEDURE — 95810 POLYSOM 6/> YRS 4/> PARAM: CPT | Performed by: FAMILY MEDICINE

## 2020-08-13 PROCEDURE — 99213 OFFICE O/P EST LOW 20 MIN: CPT | Mod: 95,CR | Performed by: NURSE PRACTITIONER

## 2020-08-13 ASSESSMENT — FIBROSIS 4 INDEX: FIB4 SCORE: 1.04

## 2020-08-13 NOTE — PROGRESS NOTES
"Telemedicine Visit: Established Patient     This evaluation was conducted via Zoom, using secure and encrypted videoconferencing technology.  The patient's identity was confirmed and verbal consent for the telemedicine encounter was obtained.  Given the importance of social distancing and other strategies recommended to reduce the risk of COVID-19 transmission, I am providing medical care to this patient via audio/video visit in place of an in person visit at the request of the patient.      Subjective:   CC: sleep study results    Maryann Cardona is a 75 y.o. female presenting for sleep study results. PMH includes primary insomnia, osteopenia, open angle glaucoma bilateral, hyperparathyroidism, hypercholesterolemia, fatigue, nontoxic multinodular goiter, vit D deficiency.     PSG from 8/11/20 indicated normal AHI of 1.1/hr and O2 jodi was 93%. Normal oximetry. There were a total of 4 apneas consisting of 4 obstructive apneas, 0 mixed apneas, and 0 central apneas. A total of 2 hypopneas were scored.The apnea index was 0.71 per hour and the hypopnea index was 0.36 per hour resulting in an overall AHI of 1.07.AHI during rem was 3.1 and AHI while supine was 1.98.    She goes to bed between 10-10:30 and wakes up between 2:30-3 am. She has a lot of difficulty going back to sleep for unknown reason. She thinks maybe it could be due to stress, but is not sure. She feels tired during the day, but never takes naps. She has tried meditation music which does not work. She was prescribed xanax 0.5 mg as a sleep aid which causes significant \"hang over\" side effects for her the following day, but it does help her go to sleep. She has tried Ambien which gave her nightmare and she did not tolerate this well. She cannot take OTC sleep aids due to side effects the medication may have on her glaucoma. She took one tablet of the 10 mg suvorexant that was prescribed by Dr. Napier and it caused significant hyperactivity. She was not " able to sleep because she was wide awake all night from the medication. She has not yet seen Dr. Dumont. Patient's has difficulty staying asleep which has been a chronic problem for the last couple of years.     ROS   Denies any recent fevers or chills. No nausea or vomiting. No chest pains or shortness of breath.     Allergies   Allergen Reactions   • Ofloxacin Shortness of Breath, Rash and Swelling     Severe facial and neck swelling   • Vicodin [Hydrocodone-Acetaminophen] Anxiety   • Ambien [Zolpidem Tartrate]      hallucinations       Current medicines (including changes today)  Current Outpatient Medications   Medication Sig Dispense Refill   • Ascorbic Acid (VITAMIN C) 1000 MG Tab Take  by mouth.     • VITAMIN K PO Take  by mouth.     • Zinc 50 MG Cap Take 50 mg by mouth every day.     • B Complex Vitamins (VITAMIN B COMPLEX PO) Take  by mouth.     • Cholecalciferol (VITAMIN D) 2000 UNIT Tab Take  by mouth every day.     • Naproxen Sodium (ALEVE PO) Take  by mouth.     • Brimonidine Tartrate (ALPHAGAN P) 0.1 % SOLN 1 Drop by Ophthalmic route 2 Times a Day.     • dorzolamide-timolol (COSOPT) 22.3-6.8 MG/ML SOLN Place 1 Drop in both eyes 2 times a day.     • latanoprost (XALATAN) 0.005 % SOLN Place 1 Drop in both eyes every evening.       No current facility-administered medications for this visit.        Patient Active Problem List    Diagnosis Date Noted   • Fatigue 07/06/2020   • Non-toxic multinodular goiter 07/06/2020   • Vitamin D deficiency 07/06/2020   • Macrocytic anemia 03/14/2019   • Hypercholesterolemia 04/13/2017   • History of hyperparathyroidism 12/13/2016   • Osteopenia with high risk of fracture 11/03/2015   • Primary open angle glaucoma of both eyes, indeterminate stage 11/03/2015   • Primary insomnia 08/27/2015       Family History   Problem Relation Age of Onset   • Stroke Mother    • Lung Disease Mother    • Lung Disease Father    • Osteoporosis Brother    • Diabetes Other    • Hypertension  "Other    • Stroke Other    • Cancer Other    • Cancer Maternal Aunt         breast cancer       She  has a past medical history of Anesthesia, Arrhythmia, CATARACT, Glaucoma, Other specified disorder of intestines, and Pain.  She  has a past surgical history that includes hysterectomy, vaginal (1999); rotator cuff repair (1999); abdominoplasty (2003); appendectomy (1967); knee arthroscopy (10/21/2009); medial meniscectomy (10/21/2009); lumbar laminectomy diskectomy (9/23/2011); lumbar decompression (9/23/2011); parathyroid exploration (2/1/2012); and us-needle core bx-breast panel (2001?).       Objective:   Ht 1.778 m (5' 10\")   Wt 58.1 kg (128 lb)   BMI 18.37 kg/m²     Physical Exam:  Constitutional: Alert, no distress, well-groomed.  Skin: No rashes in visible areas.  Eye: Round. Conjunctiva clear, lids normal. No icterus.   ENMT: Lips pink without lesions, good dentition, moist mucous membranes. Phonation normal.  Neck:  Moves freely without pain.  CV: Pulse as reported by patient  Respiratory: Unlabored respiratory effort, no cough or audible wheeze  Psych: Alert and oriented x3, normal affect and mood.       Assessment and Plan:   The following treatment plan was discussed:     1. Hypersomnolence    2. Chronic insomnia    3. Fatigue, unspecified type      Discussed the cardiovascular and neuropsychiatric risks of untreated NATHANAEL; including but not limited to: HTN, DM, MI, ASCVD, CVA, CHF, traffic accidents.     1. PSG from 8/11/20 indicated normal AHI of 1.1/hr and O2 jodi was 93%. Normal oximetry. There were a total of 4 apneas consisting of 4 obstructive apneas, 0 mixed apneas, and 0 central apneas. A total of 2 hypopneas were scored.The apnea index was 0.71 per hour and the hypopnea index was 0.36 per hour resulting in an overall AHI of 1.07.AHI during rem was 3.1 and AHI while supine was 1.98.  Recommendation:  Clinical correlation is required. This study does not clinically suggest obstructive sleep " apnea with a normal AHI of 1.1/hr and normal oximetry.  In general patients with daytime sleepiness are advised to avoid sedatives and do not operate a motor vehicle while drowsy.    Patient took a xanax 0.5 mg 3/4 tab on the night of the study which did not affect the results. The possible side effect would be increased AHI, which did not occur. The AHI was normal.     2. Chronic insomnia. Sleep hygiene discussed. Patient is pending a consultation with Dr. Dumont for CBT-I, and has an apt on 10/6/20. Continue OTC melatonin as sleep aid. I do not recommend continuing xanax as a sleep aid. Patient is extremely sensitive to sleep aids such as ambien, and suvorexant which she has tried.   3. Patient may benefit from additional psychology/psychiatry referral.   4. Continue to stay active.   5. Follow up with the appropriate healthcare practitioners for all other medical problems and issues.  6. Reviewed recent lab work which was normal.         Follow-up: PRN if symptoms worsen of fail to improve. Please have patient f/u with an MD.     MARTHA Millan.[email or web address].     This dictation was created using voice recognition software. The accuracy of the dictation is limited to the abilities of the software. I expect there may be some errors of grammar and possibly content.

## 2020-08-13 NOTE — LETTER
RAQUEL Millan  [facility]   [address] [place] [address]  Phone: [phone] - Fax: [phone]           Encounter Date: 8/13/2020  Provider: RAQUEL Millan  Location of Care: [facility]  [id number]      Patient:   Maryann Cardona   MR Number: [id number]   YOB: 1945     PROGRESS NOTE:  Telemedicine Visit: Established Patient     This evaluation was conducted via Zoom, using secure and encrypted videoconferencing technology.  The patient's identity was confirmed and verbal consent for the telemedicine encounter was obtained.  Given the importance of social distancing and other strategies recommended to reduce the risk of COVID-19 transmission, I am providing medical care to this patient via audio/video visit in place of an in person visit at the request of the patient.      Subjective:   CC: sleep study results    Maryann Cardona is a 75 y.o. female presenting for sleep study results. PMH includes primary insomnia, osteopenia, open angle glaucoma bilateral, hyperparathyroidism, hypercholesterolemia, fatigue, nontoxic multinodular goiter, vit D deficiency.     PSG from 8/11/20 indicated normal AHI of 1.1/hr and O2 jodi was 93%. Normal oximetry. There were a total of 4 apneas consisting of 4 obstructive apneas, 0 mixed apneas, and 0 central apneas. A total of 2 hypopneas were scored.The apnea index was 0.71 per hour and the hypopnea index was 0.36 per hour resulting in an overall AHI of 1.07.AHI during rem was 3.1 and AHI while supine was 1.98.    She goes to bed between 10-10:30 and wakes up between 2:30-3 am. She has a lot of difficulty going back to sleep for unknown reason. She thinks maybe it could be due to stress, but is not sure. She feels tired during the day, but never takes naps. She has tried meditation music which does not work. She was prescribed xanax 0.5 mg as a sleep aid which causes  "significant \"hang over\" side effects for her the following day, but it does help her go to sleep. She has tried Ambien which gave her nightmare and she did not tolerate this well. She cannot take OTC sleep aids due to side effects the medication may have on her glaucoma. She took one tablet of the 10 mg suvorexant that was prescribed by Dr. Napier and it caused significant hyperactivity. She was not able to sleep because she was wide awake all night from the medication. She has not yet seen Dr. Dumont. Patient's has difficulty staying asleep which has been a chronic problem for the last couple of years.     ROS   Denies any recent fevers or chills. No nausea or vomiting. No chest pains or shortness of breath.     Allergies   Allergen Reactions   • Ofloxacin Shortness of Breath, Rash and Swelling     Severe facial and neck swelling   • Vicodin [Hydrocodone-Acetaminophen] Anxiety   • Ambien [Zolpidem Tartrate]      hallucinations       Current medicines (including changes today)  Current Outpatient Medications   Medication Sig Dispense Refill   • Ascorbic Acid (VITAMIN C) 1000 MG Tab Take  by mouth.     • VITAMIN K PO Take  by mouth.     • Zinc 50 MG Cap Take 50 mg by mouth every day.     • B Complex Vitamins (VITAMIN B COMPLEX PO) Take  by mouth.     • Cholecalciferol (VITAMIN D) 2000 UNIT Tab Take  by mouth every day.     • Naproxen Sodium (ALEVE PO) Take  by mouth.     • Brimonidine Tartrate (ALPHAGAN P) 0.1 % SOLN 1 Drop by Ophthalmic route 2 Times a Day.     • dorzolamide-timolol (COSOPT) 22.3-6.8 MG/ML SOLN Place 1 Drop in both eyes 2 times a day.     • latanoprost (XALATAN) 0.005 % SOLN Place 1 Drop in both eyes every evening.       No current facility-administered medications for this visit.        Patient Active Problem List    Diagnosis Date Noted   • Fatigue 07/06/2020   • Non-toxic multinodular goiter 07/06/2020   • Vitamin D deficiency 07/06/2020   • Macrocytic anemia 03/14/2019   • Hypercholesterolemia " "04/13/2017   • History of hyperparathyroidism 12/13/2016   • Osteopenia with high risk of fracture 11/03/2015   • Primary open angle glaucoma of both eyes, indeterminate stage 11/03/2015   • Primary insomnia 08/27/2015       Family History   Problem Relation Age of Onset   • Stroke Mother    • Lung Disease Mother    • Lung Disease Father    • Osteoporosis Brother    • Diabetes Other    • Hypertension Other    • Stroke Other    • Cancer Other    • Cancer Maternal Aunt         breast cancer       She  has a past medical history of Anesthesia, Arrhythmia, CATARACT, Glaucoma, Other specified disorder of intestines, and Pain.  She  has a past surgical history that includes hysterectomy, vaginal (1999); rotator cuff repair (1999); abdominoplasty (2003); appendectomy (1967); knee arthroscopy (10/21/2009); medial meniscectomy (10/21/2009); lumbar laminectomy diskectomy (9/23/2011); lumbar decompression (9/23/2011); parathyroid exploration (2/1/2012); and us-needle core bx-breast panel (2001?).       Objective:   Ht 1.778 m (5' 10\")   Wt 58.1 kg (128 lb)   BMI 18.37 kg/m²     Physical Exam:  Constitutional: Alert, no distress, well-groomed.  Skin: No rashes in visible areas.  Eye: Round. Conjunctiva clear, lids normal. No icterus.   ENMT: Lips pink without lesions, good dentition, moist mucous membranes. Phonation normal.  Neck:  Moves freely without pain.  CV: Pulse as reported by patient  Respiratory: Unlabored respiratory effort, no cough or audible wheeze  Psych: Alert and oriented x3, normal affect and mood.       Assessment and Plan:   The following treatment plan was discussed:     1. Hypersomnolence    2. Chronic insomnia    3. Fatigue, unspecified type      Discussed the cardiovascular and neuropsychiatric risks of untreated NATHANAEL; including but not limited to: HTN, DM, MI, ASCVD, CVA, CHF, traffic accidents.     1. PSG from 8/11/20 indicated normal AHI of 1.1/hr and O2 jodi was 93%. Normal oximetry. There were a " total of 4 apneas consisting of 4 obstructive apneas, 0 mixed apneas, and 0 central apneas. A total of 2 hypopneas were scored.The apnea index was 0.71 per hour and the hypopnea index was 0.36 per hour resulting in an overall AHI of 1.07.AHI during rem was 3.1 and AHI while supine was 1.98.  Recommendation:  Clinical correlation is required. This study does not clinically suggest obstructive sleep apnea with a normal AHI of 1.1/hr and normal oximetry.  In general patients with daytime sleepiness are advised to avoid sedatives and do not operate a motor vehicle while drowsy.    Patient took a xanax 0.5 mg 3/4 tab on the night of the study which did not affect the results. The possible side effect would be increased AHI, which did not occur. The AHI was normal.     2. Chronic insomnia. Sleep hygiene discussed. Patient is pending a consultation with Dr. Dumont for CBT-I, and has an apt on 10/6/20. Continue OTC melatonin as sleep aid. I do not recommend continuing xanax as a sleep aid. Patient is extremely sensitive to sleep aids such as ambien, and suvorexant which she has tried.   3. Patient may benefit from additional psychology/psychiatry referral.   4. Continue to stay active.   5. Follow up with the appropriate healthcare practitioners for all other medical problems and issues.  6. Reviewed recent lab work which was normal.         Follow-up: PRN if symptoms worsen of fail to improve. Please have patient f/u with an MD.     RAQUEL Millan     This dictation was created using voice recognition software. The accuracy of the dictation is limited to the abilities of the software. I expect there may be some errors of grammar and possibly content.                   Electronically signed by RAQUEL Millan  on 08/13/20    [address] [phone]  Via In Basket

## 2020-10-27 ENCOUNTER — HOSPITAL ENCOUNTER (OUTPATIENT)
Dept: LAB | Facility: MEDICAL CENTER | Age: 75
End: 2020-10-27
Attending: INTERNAL MEDICINE
Payer: MEDICARE

## 2020-10-27 DIAGNOSIS — E78.00 HYPERCHOLESTEROLEMIA: ICD-10-CM

## 2020-10-27 DIAGNOSIS — D53.9 MACROCYTIC ANEMIA: ICD-10-CM

## 2020-10-27 DIAGNOSIS — E55.9 VITAMIN D DEFICIENCY: ICD-10-CM

## 2020-10-27 DIAGNOSIS — Z86.39 HISTORY OF HYPERPARATHYROIDISM: ICD-10-CM

## 2020-10-27 LAB
25(OH)D3 SERPL-MCNC: 89 NG/ML (ref 30–100)
ALBUMIN SERPL BCP-MCNC: 4.2 G/DL (ref 3.2–4.9)
ALBUMIN/GLOB SERPL: 1.6 G/DL
ALP SERPL-CCNC: 73 U/L (ref 30–99)
ALT SERPL-CCNC: 21 U/L (ref 2–50)
ANION GAP SERPL CALC-SCNC: 9 MMOL/L (ref 7–16)
AST SERPL-CCNC: 17 U/L (ref 12–45)
BASOPHILS # BLD AUTO: 1.7 % (ref 0–1.8)
BASOPHILS # BLD: 0.09 K/UL (ref 0–0.12)
BILIRUB SERPL-MCNC: 0.5 MG/DL (ref 0.1–1.5)
BUN SERPL-MCNC: 20 MG/DL (ref 8–22)
CALCIUM SERPL-MCNC: 9.9 MG/DL (ref 8.5–10.5)
CHLORIDE SERPL-SCNC: 102 MMOL/L (ref 96–112)
CHOLEST SERPL-MCNC: 196 MG/DL (ref 100–199)
CO2 SERPL-SCNC: 24 MMOL/L (ref 20–33)
CREAT SERPL-MCNC: 0.72 MG/DL (ref 0.5–1.4)
EOSINOPHIL # BLD AUTO: 0.51 K/UL (ref 0–0.51)
EOSINOPHIL NFR BLD: 9.6 % (ref 0–6.9)
ERYTHROCYTE [DISTWIDTH] IN BLOOD BY AUTOMATED COUNT: 47.1 FL (ref 35.9–50)
FASTING STATUS PATIENT QL REPORTED: NORMAL
GLOBULIN SER CALC-MCNC: 2.6 G/DL (ref 1.9–3.5)
GLUCOSE SERPL-MCNC: 94 MG/DL (ref 65–99)
HCT VFR BLD AUTO: 43.1 % (ref 37–47)
HDLC SERPL-MCNC: 92 MG/DL
HGB BLD-MCNC: 13.9 G/DL (ref 12–16)
IMM GRANULOCYTES # BLD AUTO: 0.01 K/UL (ref 0–0.11)
IMM GRANULOCYTES NFR BLD AUTO: 0.2 % (ref 0–0.9)
LDLC SERPL CALC-MCNC: 82 MG/DL
LYMPHOCYTES # BLD AUTO: 1.76 K/UL (ref 1–4.8)
LYMPHOCYTES NFR BLD: 33.2 % (ref 22–41)
MCH RBC QN AUTO: 32 PG (ref 27–33)
MCHC RBC AUTO-ENTMCNC: 32.3 G/DL (ref 33.6–35)
MCV RBC AUTO: 99.3 FL (ref 81.4–97.8)
MONOCYTES # BLD AUTO: 0.44 K/UL (ref 0–0.85)
MONOCYTES NFR BLD AUTO: 8.3 % (ref 0–13.4)
NEUTROPHILS # BLD AUTO: 2.49 K/UL (ref 2–7.15)
NEUTROPHILS NFR BLD: 47 % (ref 44–72)
NRBC # BLD AUTO: 0 K/UL
NRBC BLD-RTO: 0 /100 WBC
PLATELET # BLD AUTO: 277 K/UL (ref 164–446)
PMV BLD AUTO: 9.7 FL (ref 9–12.9)
POTASSIUM SERPL-SCNC: 4 MMOL/L (ref 3.6–5.5)
PROT SERPL-MCNC: 6.8 G/DL (ref 6–8.2)
PTH-INTACT SERPL-MCNC: 31 PG/ML (ref 14–72)
RBC # BLD AUTO: 4.34 M/UL (ref 4.2–5.4)
SODIUM SERPL-SCNC: 135 MMOL/L (ref 135–145)
T4 FREE SERPL-MCNC: 1.17 NG/DL (ref 0.93–1.7)
TRIGL SERPL-MCNC: 111 MG/DL (ref 0–149)
TSH SERPL DL<=0.005 MIU/L-ACNC: 1.78 UIU/ML (ref 0.38–5.33)
VIT B12 SERPL-MCNC: 1390 PG/ML (ref 211–911)
WBC # BLD AUTO: 5.3 K/UL (ref 4.8–10.8)

## 2020-10-27 PROCEDURE — 80061 LIPID PANEL: CPT

## 2020-10-27 PROCEDURE — 80053 COMPREHEN METABOLIC PANEL: CPT

## 2020-10-27 PROCEDURE — 82607 VITAMIN B-12: CPT

## 2020-10-27 PROCEDURE — 82306 VITAMIN D 25 HYDROXY: CPT

## 2020-10-27 PROCEDURE — 36415 COLL VENOUS BLD VENIPUNCTURE: CPT

## 2020-10-27 PROCEDURE — 85025 COMPLETE CBC W/AUTO DIFF WBC: CPT

## 2020-10-27 PROCEDURE — 84443 ASSAY THYROID STIM HORMONE: CPT

## 2020-10-27 PROCEDURE — 82523 COLLAGEN CROSSLINKS: CPT

## 2020-10-27 PROCEDURE — 83970 ASSAY OF PARATHORMONE: CPT

## 2020-10-27 PROCEDURE — 84439 ASSAY OF FREE THYROXINE: CPT

## 2020-11-03 RX ORDER — A/SINGAPORE/GP1908/2015 IVR-180 (AN A/MICHIGAN/45/2015 (H1N1)PDM09-LIKE VIRUS, A/HONG KONG/4801/2014, NYMC X-263B (H3N2) (AN A/HONG KONG/4801/2014-LIKE VIRUS), AND B/BRISBANE/60/2008, WILD TYPE (A B/BRISBANE/60/2008-LIKE VIRUS) 15; 15; 15 UG/.5ML; UG/.5ML; UG/.5ML
0.5 INJECTION, SUSPENSION INTRAMUSCULAR
COMMUNITY
Start: 2020-09-25 | End: 2021-03-04

## 2020-11-03 NOTE — PROGRESS NOTES
ANNUAL WELLNESS VISIT PRE-VISIT PLANNING WITHOUT OUTREACH    1.  Reviewed note from last office visit with PCP: YES    2.  If any orders were placed at last visit, do we have Results/Consult Notes?        •  Labs - Labs ordered, completed on 10/27/2020 and results are in chart.  Note: If patient appointment is for lab review and patient did not complete labs, check with provider if OK to reschedule patient until labs completed.       •  Imaging - Imaging ordered, completed and results are in chart.       •  Referrals - No referrals were ordered at last office visit.    3.  Immunizations were updated in Epic using WebIZ?: Epic matches WebIZ       •  WebIZ Recommendations: FLU        •  Is patient due for Tdap? NO       •  Is patient due for Shingrix? NO     4.  Patient is due for the following Health Maintenance Topics:   Health Maintenance Due   Topic Date Due   • Annual Wellness Visit  04/05/2019   • IMM INFLUENZA (1) 09/01/2020       5.  Reviewed/Updated the following with patient:       •   Preferred Pharmacy? YES       •   Preferred Lab? YES       •   Preferred Communication? YES       •   Allergies? YES       •   Medications? YES. Was Abstract Encounter opened and chart updated? YES       •   Social History? YES. Was Abstract Encounter opened and chart updated? YES       •   Family History (document living status of immediate family members and if + hx of  cancer, diabetes, hypertension, hyperlipidemia, heart attack, stroke) YES. Was Abstract Encounter opened and chart updated? YES    6.  Care Team Updated:       •   DME Company (gait device, O2, CPAP, etc.): NO       •   Other Specialists (eye doctor, derm, GYN, cardiology, endo, etc): N\A    7. Orders for overdue Health Maintenance topics pended in Pre-Charting? N\A    8.  Patient has the following Care Path diagnoses on Problem List:  NONE    9.  Patient was advised: “This is a free wellness visit. The provider will screen for medical conditions to help you  stay healthy. If you have other concerns to address you may be asked to discuss these at a separate visit or there may be an additional fee.”     10.  Patient was informed to arrive 15 min prior to their scheduled appointment and bring in their medication bottles.

## 2020-11-04 ENCOUNTER — OFFICE VISIT (OUTPATIENT)
Dept: MEDICAL GROUP | Age: 75
End: 2020-11-04
Payer: MEDICARE

## 2020-11-04 VITALS
HEIGHT: 70 IN | DIASTOLIC BLOOD PRESSURE: 58 MMHG | SYSTOLIC BLOOD PRESSURE: 100 MMHG | WEIGHT: 122.8 LBS | BODY MASS INDEX: 17.58 KG/M2 | OXYGEN SATURATION: 96 % | TEMPERATURE: 97.7 F | HEART RATE: 72 BPM

## 2020-11-04 DIAGNOSIS — Z86.39 HISTORY OF HYPERPARATHYROIDISM: ICD-10-CM

## 2020-11-04 DIAGNOSIS — F51.01 PRIMARY INSOMNIA: ICD-10-CM

## 2020-11-04 DIAGNOSIS — E78.00 HYPERCHOLESTEROLEMIA: ICD-10-CM

## 2020-11-04 DIAGNOSIS — D53.9 MACROCYTIC ANEMIA: ICD-10-CM

## 2020-11-04 DIAGNOSIS — M85.80 OSTEOPENIA WITH HIGH RISK OF FRACTURE: ICD-10-CM

## 2020-11-04 DIAGNOSIS — E55.9 VITAMIN D DEFICIENCY: ICD-10-CM

## 2020-11-04 DIAGNOSIS — Z00.00 MEDICARE ANNUAL WELLNESS VISIT, SUBSEQUENT: ICD-10-CM

## 2020-11-04 PROCEDURE — 99214 OFFICE O/P EST MOD 30 MIN: CPT | Mod: KB | Performed by: INTERNAL MEDICINE

## 2020-11-04 RX ORDER — LORAZEPAM 0.5 MG/1
0.5 TABLET ORAL NIGHTLY PRN
Qty: 90 TAB | Refills: 5 | Status: SHIPPED | OUTPATIENT
Start: 2020-11-04 | End: 2020-12-04

## 2020-11-04 RX ORDER — TRAZODONE HYDROCHLORIDE 50 MG/1
50 TABLET ORAL
Qty: 90 TAB | Refills: 3 | Status: SHIPPED | OUTPATIENT
Start: 2020-11-04 | End: 2021-03-04 | Stop reason: SDUPTHER

## 2020-11-04 SDOH — HEALTH STABILITY: MENTAL HEALTH: HOW OFTEN DO YOU HAVE A DRINK CONTAINING ALCOHOL?: 2-4 TIMES A MONTH

## 2020-11-04 ASSESSMENT — ENCOUNTER SYMPTOMS
GENERAL WELL-BEING: EXCELLENT
GASTROINTESTINAL NEGATIVE: 1
CONSTITUTIONAL NEGATIVE: 1
NEUROLOGICAL NEGATIVE: 1
RESPIRATORY NEGATIVE: 1
MUSCULOSKELETAL NEGATIVE: 1
EYES NEGATIVE: 1
PSYCHIATRIC NEGATIVE: 1
CARDIOVASCULAR NEGATIVE: 1

## 2020-11-04 ASSESSMENT — FIBROSIS 4 INDEX: FIB4 SCORE: 1.02

## 2020-11-04 ASSESSMENT — PATIENT HEALTH QUESTIONNAIRE - PHQ9: CLINICAL INTERPRETATION OF PHQ2 SCORE: 0

## 2020-11-04 ASSESSMENT — ACTIVITIES OF DAILY LIVING (ADL): BATHING_REQUIRES_ASSISTANCE: 0

## 2020-11-04 ASSESSMENT — PAIN SCALES - GENERAL: PAINLEVEL: NO PAIN

## 2020-11-04 NOTE — PROGRESS NOTES
"Chief Complaint   Patient presents with   • Annual Wellness Visit         HPI:  Maryann is a 75 y.o. here for Medicare Annual Wellness Visit        Patient Active Problem List    Diagnosis Date Noted   • Fatigue 07/06/2020   • Non-toxic multinodular goiter 07/06/2020   • Vitamin D deficiency 07/06/2020   • Macrocytic anemia 03/14/2019   • Hypercholesterolemia 04/13/2017   • History of hyperparathyroidism 12/13/2016   • Osteopenia with high risk of fracture 11/03/2015   • Primary open angle glaucoma of both eyes, indeterminate stage 11/03/2015   • Primary insomnia 08/27/2015       Current Outpatient Medications   Medication Sig Dispense Refill   • CALCIUM PO Take  by mouth. Pt states uses \"allegy Calcium\"     • NON SPECIFIED every day. Strotium     • FLUAD QUADRIVALENT 0.5 ML Prefilled Syringe 0.5 mL by Intramuscular route. Administer 0.5 mL intramuscularly as directed     • Ascorbic Acid (VITAMIN C) 1000 MG Tab Take  by mouth.     • VITAMIN K PO Take  by mouth.     • Zinc 50 MG Cap Take 50 mg by mouth every day.     • B Complex Vitamins (VITAMIN B COMPLEX PO) Take  by mouth.     • Cholecalciferol (VITAMIN D) 2000 UNIT Tab Take  by mouth every day.     • Naproxen Sodium (ALEVE PO) Take  by mouth.     • Brimonidine Tartrate (ALPHAGAN P) 0.1 % SOLN 1 Drop by Ophthalmic route 2 Times a Day.     • dorzolamide-timolol (COSOPT) 22.3-6.8 MG/ML SOLN Place 1 Drop in both eyes 2 times a day.     • latanoprost (XALATAN) 0.005 % SOLN Place 1 Drop in both eyes every evening.       No current facility-administered medications for this visit.         Patient is taking medications as noted in medication list.  Current supplements as per medication list.     Allergies: Ofloxacin, Vicodin [hydrocodone-acetaminophen], and Ambien [zolpidem tartrate]    Current social contact/activities: Palliates weights 5-7 days a week, pt works 30 hours weekly, reading, cross word puzzles, [employer], visits with family and friends during global pandemic " COVID-19.    Is patient current with immunizations? No, due for FLU and Pt states she previously had Flu Vac. Oct. 2020. Patient is interested in receiving NONE today.    She  reports that she has never smoked. She has never used smokeless tobacco. She reports current alcohol use. She reports previous drug use. Drug: Marijuana.  Counseling given: Not Answered        DPA/Advanced directive: Patient has Advanced Directive on file.     ROS:    Gait: Uses no assistive device   Ostomy: No   Other tubes: No   Amputations: No   Chronic oxygen use No   Last eye exam: Year 2020  Wears hearing aids: No   : Denies any urinary leakage during the last 6 months      Screening:        Depression Screening    Little interest or pleasure in doing things?  0 - not at all  Feeling down, depressed, or hopeless? 0 - not at all  Patient Health Questionnaire Score: 0    If depressive symptoms identified deferred to follow up visit unless specifically addressed in assessment and plan.    Interpretation of PHQ-9 Total Score   Score Severity   1-4 No Depression   5-9 Mild Depression   10-14 Moderate Depression   15-19 Moderately Severe Depression   20-27 Severe Depression    Screening for Cognitive Impairment    Three Minute Recall (river, nation, finger)  3/3 River, Nation, Finger  Jacob clock face with all 12 numbers and set the hands to show 10 past 11.  Yes 2/5  If cognitive concerns identified, deferred for follow up unless specifically addressed in assessment and plan.    Fall Risk Assessment    Has the patient had two or more falls in the last year or any fall with injury in the last year?  No  If fall risk identified, deferred for follow up unless specifically addressed in assessment and plan.    Safety Assessment    Throw rugs on floor.  Yes  Handrails on all stairs.  Yes  Good lighting in all hallways.  Yes  Difficulty hearing.  No  Patient counseled about all safety risks that were identified.    Functional Assessment ADLs    Are  there any barriers preventing you from cooking for yourself or meeting nutritional needs?  No.    Are there any barriers preventing you from driving safely or obtaining transportation?  No.    Are there any barriers preventing you from using a telephone or calling for help?  No.    Are there any barriers preventing you from shopping?  No.    Are there any barriers preventing you from taking care of your own finances?  No.    Are there any barriers preventing you from managing your medications?  No.    Are there any barriers preventing you from showering, bathing or dressing yourself?  No.    Are you currently engaging in any exercise or physical activity?  Yes.  Palliates weights 5-7 days a week  What is your perception of your health?  Excellent.    Health Maintenance Summary                Annual Wellness Visit Overdue 4/5/2019      Done 4/4/2018 Visit Dx: Medicare annual wellness visit, subsequent     Patient has more history with this topic...    IMM INFLUENZA Overdue 9/1/2020      Done 11/1/2019 Imm Admin: Influenza Vaccine Adult HD    MAMMOGRAM Next Due 2/28/2021      Done 2/28/2020 MA-SCREENING MAMMO BILAT W/TOMOSYNTHESIS W/CAD     Patient has more history with this topic...    BONE DENSITY Next Due 2/28/2022      Done 2/28/2020 DS-BONE DENSITY STUDY (DEXA)     Patient has more history with this topic...    COLONOSCOPY Next Due 11/3/2025      Postponed 11/3/2015     IMM DTaP/Tdap/Td Vaccine Next Due 3/27/2026      Done 3/27/2016 Imm Admin: Tdap Vaccine          Patient Care Team:  Hardeep Kirkpatrick M.D. as PCP - General (Internal Medicine)  Jeff Ariza M.D. as Consulting Physician (Dermatology)  Mainor Bishop M.D. as Consulting Physician (Gynecology)  Kelsey Souza M.D. as Consulting Physician (Endocrinology)  Hardeep Rios M.D. as Consulting Physician (Ophthalmology)    Social History     Tobacco Use   • Smoking status: Never Smoker   • Smokeless tobacco: Never Used   Substance Use Topics   •  "Alcohol use: Yes     Alcohol/week: 0.0 oz     Frequency: 2-4 times a month     Drinks per session: 1 or 2     Binge frequency: Never     Comment: occ   • Drug use: Not Currently     Types: Marijuana     Comment: tried it for sleep     Family History   Problem Relation Age of Onset   • Stroke Mother    • Lung Disease Mother         emphysema   • Lung Disease Father    • Cancer Father    • Osteoporosis Brother    • Diabetes Other    • Hypertension Other    • Stroke Other    • Cancer Other    • Cancer Maternal Aunt         breast cancer   • No Known Problems Maternal Grandmother    • Diabetes Maternal Grandfather    • No Known Problems Paternal Grandmother    • No Known Problems Paternal Grandfather      She  has a past medical history of Anesthesia, Arrhythmia, CATARACT, Glaucoma, Other specified disorder of intestines, and Pain.   Past Surgical History:   Procedure Laterality Date   • PARATHYROID EXPLORATION  2/1/2012    Performed by RICKY CASEY at SURGERY SAME DAY [facility] ORS   • LUMBAR LAMINECTOMY DISKECTOMY  9/23/2011    Performed by JADEN WALL at SURGERY [facility] ORS   • LUMBAR DECOMPRESSION  9/23/2011    Performed by JADEN WALL at SURGERY [facility] ORS   • KNEE ARTHROSCOPY  10/21/2009    Performed by TRACI PADRON at SURGERY [facility]   • MEDIAL MENISCECTOMY  10/21/2009    Performed by TRACI PADRON at SURGERY [facility]   • ABDOMINOPLASTY  2003   • HYSTERECTOMY, VAGINAL  1999   • ROTATOR CUFF REPAIR  1999   • APPENDECTOMY  1967   • US-NEEDLE CORE BX-BREAST PANEL  2001?    right breast, benign           Exam:     /58 (BP Location: Right arm, Patient Position: Sitting, BP Cuff Size: Adult)   Pulse 72   Temp 36.5 °C (97.7 °F) (Temporal)   Ht 1.778 m (5' 10\")   Wt 55.7 kg (122 lb 12.8 oz)   SpO2 96%  Body mass index is 17.62 kg/m².    Hearing excellent.    Dentition good  Alert, oriented in no acute distress.  Eye contact is good, speech goal directed, affect calm   "     Assessment and Plan. The following treatment and monitoring plan is recommended:     No diagnosis found.  s see provider note      Services suggested: No services needed at this time   Health Care Screening recommendations as per orders if indicated.  Referrals offered: PT/OT/Nutrition counseling/Behavioral Health/Smoking cessation as per orders if indicated.    Discussion today about general wellness and lifestyle habits:    · Prevent falls and reduce trip hazards; Cautioned about securing or removing rugs.  · Have a working fire alarm and carbon monoxide detector;   · Engage in regular physical activity and social activities       Follow-up: No follow-ups on file.

## 2020-11-04 NOTE — PROGRESS NOTES
"Subjective:      Maryann Cardona is a 75 y.o. female who presents with Annual Wellness Visit  The patient is here for followup of chronic medical problems listed below. The patient is compliant with medications and having no side effects from them. Denies chest pain, abdominal pain, dyspnea, myalgias, or cough.   Patient Active Problem List    Diagnosis Date Noted   • Fatigue 07/06/2020   • Non-toxic multinodular goiter 07/06/2020   • Vitamin D deficiency 07/06/2020   • Macrocytic anemia 03/14/2019   • Hypercholesterolemia 04/13/2017   • History of hyperparathyroidism 12/13/2016   • Osteopenia with high risk of fracture 11/03/2015   • Primary open angle glaucoma of both eyes, indeterminate stage 11/03/2015   • Primary insomnia 08/27/2015     Allergies   Allergen Reactions   • Ofloxacin Shortness of Breath, Rash and Swelling     Severe facial and neck swelling   • Vicodin [Hydrocodone-Acetaminophen] Anxiety   • Ambien [Zolpidem Tartrate]      hallucinations     Outpatient Medications Prior to Visit   Medication Sig Dispense Refill   • CALCIUM PO Take  by mouth. Pt states uses \"allegy Calcium\"     • NON SPECIFIED every day. Strotium     • FLUAD QUADRIVALENT 0.5 ML Prefilled Syringe 0.5 mL by Intramuscular route. Administer 0.5 mL intramuscularly as directed     • Ascorbic Acid (VITAMIN C) 1000 MG Tab Take  by mouth.     • VITAMIN K PO Take  by mouth.     • Zinc 50 MG Cap Take 50 mg by mouth every day.     • B Complex Vitamins (VITAMIN B COMPLEX PO) Take  by mouth.     • Cholecalciferol (VITAMIN D) 2000 UNIT Tab Take  by mouth every day.     • Naproxen Sodium (ALEVE PO) Take  by mouth.     • Brimonidine Tartrate (ALPHAGAN P) 0.1 % SOLN 1 Drop by Ophthalmic route 2 Times a Day.     • dorzolamide-timolol (COSOPT) 22.3-6.8 MG/ML SOLN Place 1 Drop in both eyes 2 times a day.     • latanoprost (XALATAN) 0.005 % SOLN Place 1 Drop in both eyes every evening.       No facility-administered medications prior to visit.  "     Hospital Outpatient Visit on 10/27/2020   Component Date Value   • TSH 10/27/2020 1.780    • Free T-4 10/27/2020 1.17    Hospital Outpatient Visit on 10/27/2020   Component Date Value   • Pth, Intact 10/27/2020 31.0    • Vitamin B12 -True Cobala* 10/27/2020 1390*   • 25-Hydroxy   Vitamin D 25 10/27/2020 89    • WBC 10/27/2020 5.3    • RBC 10/27/2020 4.34    • Hemoglobin 10/27/2020 13.9    • Hematocrit 10/27/2020 43.1    • MCV 10/27/2020 99.3*   • MCH 10/27/2020 32.0    • MCHC 10/27/2020 32.3*   • RDW 10/27/2020 47.1    • Platelet Count 10/27/2020 277    • MPV 10/27/2020 9.7    • Neutrophils-Polys 10/27/2020 47.00    • Lymphocytes 10/27/2020 33.20    • Monocytes 10/27/2020 8.30    • Eosinophils 10/27/2020 9.60*   • Basophils 10/27/2020 1.70    • Immature Granulocytes 10/27/2020 0.20    • Nucleated RBC 10/27/2020 0.00    • Neutrophils (Absolute) 10/27/2020 2.49    • Lymphs (Absolute) 10/27/2020 1.76    • Monos (Absolute) 10/27/2020 0.44    • Eos (Absolute) 10/27/2020 0.51    • Baso (Absolute) 10/27/2020 0.09    • Immature Granulocytes (a* 10/27/2020 0.01    • NRBC (Absolute) 10/27/2020 0.00    • Cholesterol,Tot 10/27/2020 196    • Triglycerides 10/27/2020 111    • HDL 10/27/2020 92    • LDL 10/27/2020 82    • Sodium 10/27/2020 135    • Potassium 10/27/2020 4.0    • Chloride 10/27/2020 102    • Co2 10/27/2020 24    • Anion Gap 10/27/2020 9.0    • Glucose 10/27/2020 94    • Bun 10/27/2020 20    • Creatinine 10/27/2020 0.72    • Calcium 10/27/2020 9.9    • AST(SGOT) 10/27/2020 17    • ALT(SGPT) 10/27/2020 21    • Alkaline Phosphatase 10/27/2020 73    • Total Bilirubin 10/27/2020 0.5    • Albumin 10/27/2020 4.2    • Total Protein 10/27/2020 6.8    • Globulin 10/27/2020 2.6    • A-G Ratio 10/27/2020 1.6    • Fasting Status 10/27/2020 Fasting    • GFR If  10/27/2020 >60    • GFR If Non  Ameri* 10/27/2020 >60     ./alll  No results found for: HBA1C  Lab Results   Component Value Date/Time    SODIUM  "135 10/27/2020 09:08 AM    POTASSIUM 4.0 10/27/2020 09:08 AM    CHLORIDE 102 10/27/2020 09:08 AM    CO2 24 10/27/2020 09:08 AM    GLUCOSE 94 10/27/2020 09:08 AM    BUN 20 10/27/2020 09:08 AM    CREATININE 0.72 10/27/2020 09:08 AM    CREATININE 0.7 09/04/2008 11:10 PM    BUNCREATRAT 21 10/27/2015 09:43 AM    ALKPHOSPHAT 73 10/27/2020 09:08 AM    ASTSGOT 17 10/27/2020 09:08 AM    ALTSGPT 21 10/27/2020 09:08 AM    TBILIRUBIN 0.5 10/27/2020 09:08 AM     Lab Results   Component Value Date/Time    INR 0.90 03/27/2016 07:43 PM    INR 1.14 05/09/2011 04:25 AM    INR 1.12 05/07/2011 04:00 AM     Lab Results   Component Value Date/Time    CHOLSTRLTOT 196 10/27/2020 09:08 AM    LDL 82 10/27/2020 09:08 AM    HDL 92 10/27/2020 09:08 AM    TRIGLYCERIDE 111 10/27/2020 09:08 AM       No results found for: TESTOSTERONE  Lab Results   Component Value Date/Time    TSH 1.090 10/27/2015 09:43 AM     Lab Results   Component Value Date/Time    FREET4 1.17 10/27/2020 09:10 AM    FREET4 1.18 06/04/2020 08:56 AM     No results found for: URICACID  No components found for: VITB12  Lab Results   Component Value Date/Time    25HYDROXY 89 10/27/2020 09:08 AM    25HYDROXY 68 06/04/2020 08:56 AM               HPI    Review of Systems   Constitutional: Negative.    HENT: Negative.    Eyes: Negative.    Respiratory: Negative.    Cardiovascular: Negative.    Gastrointestinal: Negative.    Genitourinary: Negative.    Musculoskeletal: Negative.    Skin: Negative.    Neurological: Negative.    Endo/Heme/Allergies: Negative.    Psychiatric/Behavioral: Negative.           Objective:     /58 (BP Location: Right arm, Patient Position: Sitting, BP Cuff Size: Adult)   Pulse 72   Temp 36.5 °C (97.7 °F) (Temporal)   Ht 1.778 m (5' 10\")   Wt 55.7 kg (122 lb 12.8 oz)   SpO2 96%   BMI 17.62 kg/m²        Physical Exam  Vitals signs and nursing note reviewed.   Constitutional:       Appearance: She is well-developed.   HENT:      Head: Normocephalic and " atraumatic.      Right Ear: External ear normal.      Left Ear: External ear normal.      Nose: Nose normal.   Eyes:      General: No scleral icterus.        Right eye: No discharge.         Left eye: No discharge.      Conjunctiva/sclera: Conjunctivae normal.      Pupils: Pupils are equal, round, and reactive to light.   Neck:      Musculoskeletal: Normal range of motion and neck supple.      Thyroid: No thyromegaly.      Vascular: No JVD.      Trachea: No tracheal deviation.   Cardiovascular:      Rate and Rhythm: Normal rate and regular rhythm.      Heart sounds: Normal heart sounds. No friction rub. No gallop.    Pulmonary:      Effort: Pulmonary effort is normal. No respiratory distress.      Breath sounds: Normal breath sounds. No stridor. No wheezing or rales.   Chest:      Chest wall: No tenderness.   Abdominal:      General: Bowel sounds are normal. There is no distension.      Palpations: Abdomen is soft. There is no mass.      Tenderness: There is no abdominal tenderness. There is no guarding or rebound.      Hernia: No hernia is present.   Musculoskeletal: Normal range of motion.         General: No tenderness.   Lymphadenopathy:      Cervical: No cervical adenopathy.   Skin:     General: Skin is warm and dry.      Coloration: Skin is not pale.      Findings: No erythema or rash.   Neurological:      Mental Status: She is alert and oriented to person, place, and time.      Cranial Nerves: No cranial nerve deficit.      Coordination: Coordination normal.      Deep Tendon Reflexes: Reflexes are normal and symmetric. Reflexes normal.   Psychiatric:         Behavior: Behavior normal.         Thought Content: Thought content normal.         Judgment: Judgment normal.                 Assessment/Plan:        1. Medicare annual wellness visit, subsequent  Wellness exam completed.  No significant findings.  No underlying dementia or depression and up-to-date on current    health maintenance    2. Primary  insomnia-chronic ongoing problem but has never tried trazodone.  Trial of this.  If that has breakthrough insomnia or cannot tolerate we will switch to lorazepam which she is used in the past but warned about potential addiction and use it sporadically sparingly.     - traZODone (DESYREL) 50 MG Tab; Take 1 Tab by mouth every bedtime.  Dispense: 90 Tab; Refill: 3  - LORazepam (ATIVAN) 0.5 MG Tab; Take 1 Tab by mouth at bedtime as needed for Anxiety for up to 30 days.  Dispense: 90 Tab; Refill: 5    3. Osteopenia with high risk of fracture   Under good control. Continue same regimen.'    4. History of hyperparathyroidism    Under good control. Continue same regimen.'  PTH level normal status post parathyroidectomy  5. Hypercholesterolemia   Under good control. Continue same regimen.'  6. Vitamin D deficiency     Under good control. Continue same regimen.'    7. Macrocytic anemia-under good control with B12 supplements.  Anemia resolved MCV borderline elevated at 99.

## 2020-11-06 LAB — COLLAGEN CTX SERPL-MCNC: 632 PG/ML

## 2021-01-11 DIAGNOSIS — Z23 NEED FOR VACCINATION: ICD-10-CM

## 2021-01-14 ENCOUNTER — IMMUNIZATION (OUTPATIENT)
Dept: FAMILY PLANNING/WOMEN'S HEALTH CLINIC | Facility: IMMUNIZATION CENTER | Age: 76
End: 2021-01-14
Attending: INTERNAL MEDICINE
Payer: MEDICARE

## 2021-01-14 DIAGNOSIS — Z23 ENCOUNTER FOR VACCINATION: Primary | ICD-10-CM

## 2021-01-14 DIAGNOSIS — Z23 NEED FOR VACCINATION: ICD-10-CM

## 2021-01-14 PROCEDURE — 0001A PFIZER SARS-COV-2 VACCINE: CPT | Performed by: INTERNAL MEDICINE

## 2021-01-14 PROCEDURE — 91300 PFIZER SARS-COV-2 VACCINE: CPT | Performed by: INTERNAL MEDICINE

## 2021-02-05 ENCOUNTER — IMMUNIZATION (OUTPATIENT)
Dept: FAMILY PLANNING/WOMEN'S HEALTH CLINIC | Facility: IMMUNIZATION CENTER | Age: 76
End: 2021-02-05
Attending: INTERNAL MEDICINE
Payer: MEDICARE

## 2021-02-05 DIAGNOSIS — Z23 ENCOUNTER FOR VACCINATION: Primary | ICD-10-CM

## 2021-02-05 PROCEDURE — 91300 PFIZER SARS-COV-2 VACCINE: CPT

## 2021-02-05 PROCEDURE — 0002A PFIZER SARS-COV-2 VACCINE: CPT

## 2021-03-04 ENCOUNTER — OFFICE VISIT (OUTPATIENT)
Dept: MEDICAL GROUP | Age: 76
End: 2021-03-04
Payer: MEDICARE

## 2021-03-04 VITALS
DIASTOLIC BLOOD PRESSURE: 66 MMHG | WEIGHT: 125 LBS | HEART RATE: 68 BPM | HEIGHT: 70 IN | TEMPERATURE: 98.4 F | SYSTOLIC BLOOD PRESSURE: 122 MMHG | OXYGEN SATURATION: 99 % | BODY MASS INDEX: 17.9 KG/M2

## 2021-03-04 DIAGNOSIS — F51.01 PRIMARY INSOMNIA: ICD-10-CM

## 2021-03-04 DIAGNOSIS — Z86.39 HISTORY OF HYPERPARATHYROIDISM: ICD-10-CM

## 2021-03-04 DIAGNOSIS — E55.9 VITAMIN D DEFICIENCY: ICD-10-CM

## 2021-03-04 DIAGNOSIS — E78.00 HYPERCHOLESTEROLEMIA: ICD-10-CM

## 2021-03-04 PROCEDURE — 99214 OFFICE O/P EST MOD 30 MIN: CPT | Performed by: INTERNAL MEDICINE

## 2021-03-04 RX ORDER — ZOLPIDEM TARTRATE 5 MG/1
5 TABLET ORAL NIGHTLY PRN
Qty: 30 TABLET | Refills: 5 | Status: SHIPPED | OUTPATIENT
Start: 2021-03-04 | End: 2023-11-07 | Stop reason: SDUPTHER

## 2021-03-04 RX ORDER — TRAZODONE HYDROCHLORIDE 50 MG/1
100 TABLET ORAL
Qty: 90 TABLET | Refills: 3
Start: 2021-03-04 | End: 2021-11-04

## 2021-03-04 RX ORDER — LORAZEPAM 0.5 MG/1
TABLET ORAL
COMMUNITY
Start: 2021-02-15 | End: 2021-03-04

## 2021-03-04 ASSESSMENT — ENCOUNTER SYMPTOMS
CARDIOVASCULAR NEGATIVE: 1
CONSTITUTIONAL NEGATIVE: 1
PSYCHIATRIC NEGATIVE: 1
EYES NEGATIVE: 1
MUSCULOSKELETAL NEGATIVE: 1
RESPIRATORY NEGATIVE: 1
NEUROLOGICAL NEGATIVE: 1
GASTROINTESTINAL NEGATIVE: 1

## 2021-03-04 ASSESSMENT — PATIENT HEALTH QUESTIONNAIRE - PHQ9: CLINICAL INTERPRETATION OF PHQ2 SCORE: 0

## 2021-03-04 ASSESSMENT — FIBROSIS 4 INDEX: FIB4 SCORE: 1.03

## 2021-03-05 NOTE — PROGRESS NOTES
"Subjective:      Maryann Cardona is a 76 y.o. female who presents with Insomnia (medication management )  The patient is here for followup of chronic medical problems listed below. The patient is compliant with medications and having no side effects from them. Denies chest pain, abdominal pain, dyspnea, myalgias, or cough.   ,  Patient Active Problem List    Diagnosis Date Noted   • Fatigue 07/06/2020   • Non-toxic multinodular goiter 07/06/2020   • Vitamin D deficiency 07/06/2020   • Macrocytic anemia 03/14/2019   • Hypercholesterolemia 04/13/2017   • History of hyperparathyroidism 12/13/2016   • Osteopenia with high risk of fracture 11/03/2015   • Primary open angle glaucoma of both eyes, indeterminate stage 11/03/2015   • Primary insomnia 08/27/2015     Allergies   Allergen Reactions   • Ofloxacin Shortness of Breath, Rash and Swelling     Severe facial and neck swelling   • Vicodin [Hydrocodone-Acetaminophen] Anxiety   • Ambien [Zolpidem Tartrate]      hallucinations     Outpatient Medications Prior to Visit   Medication Sig Dispense Refill   • Zinc 50 MG Cap Take 50 mg by mouth every day.     • Cholecalciferol (VITAMIN D) 2000 UNIT Tab Take  by mouth every day.     • LORazepam (ATIVAN) 0.5 MG Tab TAKE 1 TABLET BY MOUTH EVERY NIGHT AT BEDTIME FOR ANXIETY     • CALCIUM PO Take  by mouth. Pt states uses \"allegy Calcium\"     • NON SPECIFIED every day. Strotium     • traZODone (DESYREL) 50 MG Tab Take 1 Tab by mouth every bedtime. (Patient not taking: Reported on 3/4/2021) 90 Tab 3   • FLUAD QUADRIVALENT 0.5 ML Prefilled Syringe 0.5 mL by Intramuscular route. Administer 0.5 mL intramuscularly as directed     • VITAMIN K PO Take  by mouth.     • B Complex Vitamins (VITAMIN B COMPLEX PO) Take  by mouth.       No facility-administered medications prior to visit.     No visits with results within 1 Month(s) from this visit.   Latest known visit with results is:   Hospital Outpatient Visit on 10/27/2020   Component " "Date Value   • C. Telopeptide B Cross L* 10/27/2020 632    • TSH 10/27/2020 1.780    • Free T-4 10/27/2020 1.17       No results found for: HBA1C  Lab Results   Component Value Date/Time    SODIUM 135 10/27/2020 09:08 AM    POTASSIUM 4.0 10/27/2020 09:08 AM    CHLORIDE 102 10/27/2020 09:08 AM    CO2 24 10/27/2020 09:08 AM    GLUCOSE 94 10/27/2020 09:08 AM    BUN 20 10/27/2020 09:08 AM    CREATININE 0.72 10/27/2020 09:08 AM    CREATININE 0.7 09/04/2008 11:10 PM    BUNCREATRAT 21 10/27/2015 09:43 AM    ALKPHOSPHAT 73 10/27/2020 09:08 AM    ASTSGOT 17 10/27/2020 09:08 AM    ALTSGPT 21 10/27/2020 09:08 AM    TBILIRUBIN 0.5 10/27/2020 09:08 AM     Lab Results   Component Value Date/Time    INR 0.90 03/27/2016 07:43 PM    INR 1.14 05/09/2011 04:25 AM    INR 1.12 05/07/2011 04:00 AM     Lab Results   Component Value Date/Time    CHOLSTRLTOT 196 10/27/2020 09:08 AM    LDL 82 10/27/2020 09:08 AM    HDL 92 10/27/2020 09:08 AM    TRIGLYCERIDE 111 10/27/2020 09:08 AM       No results found for: TESTOSTERONE  Lab Results   Component Value Date/Time    TSH 1.090 10/27/2015 09:43 AM     Lab Results   Component Value Date/Time    FREET4 1.17 10/27/2020 09:10 AM    FREET4 1.18 06/04/2020 08:56 AM     No results found for: URICACID  No components found for: VITB12  Lab Results   Component Value Date/Time    25HYDROXY 89 10/27/2020 09:08 AM    25HYDROXY 68 06/04/2020 08:56 AM               HPI    Review of Systems   Constitutional: Negative.    HENT: Negative.    Eyes: Negative.    Respiratory: Negative.    Cardiovascular: Negative.    Gastrointestinal: Negative.    Genitourinary: Negative.    Musculoskeletal: Negative.    Skin: Negative.    Neurological: Negative.    Endo/Heme/Allergies: Negative.    Psychiatric/Behavioral: Negative.           Objective:     /66 (BP Location: Left arm, Patient Position: Sitting, BP Cuff Size: Adult)   Pulse 68   Temp 36.9 °C (98.4 °F) (Temporal)   Ht 1.778 m (5' 10\")   Wt 56.7 kg (125 lb)  "  SpO2 99%   BMI 17.94 kg/m²      Physical Exam  Vitals reviewed.   Constitutional:       General: She is not in acute distress.     Appearance: She is well-developed. She is not diaphoretic.   HENT:      Head: Normocephalic and atraumatic.      Right Ear: External ear normal.      Left Ear: External ear normal.      Nose: Nose normal.      Mouth/Throat:      Pharynx: No oropharyngeal exudate.   Eyes:      General: No scleral icterus.        Right eye: No discharge.         Left eye: No discharge.      Conjunctiva/sclera: Conjunctivae normal.      Pupils: Pupils are equal, round, and reactive to light.   Neck:      Thyroid: No thyromegaly.      Vascular: No JVD.      Trachea: No tracheal deviation.   Cardiovascular:      Rate and Rhythm: Normal rate and regular rhythm.      Heart sounds: Normal heart sounds. No murmur. No friction rub. No gallop.    Pulmonary:      Effort: Pulmonary effort is normal. No respiratory distress.      Breath sounds: Normal breath sounds. No stridor. No wheezing or rales.   Chest:      Chest wall: No tenderness.   Abdominal:      General: Bowel sounds are normal. There is no distension.      Palpations: Abdomen is soft. There is no mass.      Tenderness: There is no abdominal tenderness. There is no guarding or rebound.   Musculoskeletal:         General: No tenderness. Normal range of motion.      Cervical back: Normal range of motion and neck supple.   Lymphadenopathy:      Cervical: No cervical adenopathy.   Skin:     General: Skin is warm and dry.      Coloration: Skin is not pale.      Findings: No erythema or rash.   Neurological:      Mental Status: She is alert and oriented to person, place, and time.      Cranial Nerves: No cranial nerve deficit.      Motor: No abnormal muscle tone.      Coordination: Coordination normal.      Deep Tendon Reflexes: Reflexes are normal and symmetric. Reflexes normal.   Psychiatric:         Behavior: Behavior normal.         Thought Content:  Thought content normal.         Judgment: Judgment normal.                 Assessment/Plan:        1. Primary insomnia only significant milligrams at bedtime.  She will increase to 100 at bedtime for a week and then 1-50 at bedtime to see if this works.  The meantime she is given some Ambien 5 mg to take at at bedtime should this fail.  Unable to tolerate greater than due to excessive sedation      - traZODone (DESYREL) 50 MG Tab; Take 2 Tablets by mouth every bedtime.  Dispense: 90 tablet; Refill: 3  - zolpidem (AMBIEN) 5 MG Tab; Take 1 tablet by mouth at bedtime as needed for Sleep for up to 30 days.  Dispense: 30 tablet; Refill: 5    2. Hypercholesterolemia    Under good control. Continue same regimen.  - TSH; Future  - Comp Metabolic Panel; Future  - Lipid Profile; Future  - CBC WITH DIFFERENTIAL; Future  - TSH; Future  - Comp Metabolic Panel; Future  - Lipid Profile; Future  - CBC WITH DIFFERENTIAL; Future    3. Vitamin D deficiency Under good control. Continue same regimen.     - VITAMIN D,25 HYDROXY; Future  - VITAMIN D,25 HYDROXY; Future    4. History of hyperparathyroidism Under good control. Continue same regimen.          - PTH INTACT (PTH ONLY); Future

## 2021-04-01 ENCOUNTER — HOSPITAL ENCOUNTER (OUTPATIENT)
Dept: RADIOLOGY | Facility: MEDICAL CENTER | Age: 76
End: 2021-04-01
Attending: INTERNAL MEDICINE
Payer: MEDICARE

## 2021-04-01 DIAGNOSIS — Z12.31 VISIT FOR SCREENING MAMMOGRAM: ICD-10-CM

## 2021-04-01 PROCEDURE — 77063 BREAST TOMOSYNTHESIS BI: CPT

## 2021-06-24 NOTE — ASSESSMENT & PLAN NOTE
Patient has osteoporosis on lumbar spine and femur. She has increased risks of fracture from osteoporosis. Patient insists not to be treated with medication including oral bisphosphonates or injection form or Prolia as she is concerning of potential side effects. She is understanding worsening of osteoporosis without treatment, but still declined to be treated with prescription medication. She will continue to take vitamin D and calcium supplement only. Recent vitamin D level was 54 on 12/12/17. She has DEXA scan on 1/28/16 showed osteoporosis on both femur and lumbar spine with increased risk of fracture. She needs to repeat DEXA scan on 1/28/18.   3

## 2021-10-27 ENCOUNTER — HOSPITAL ENCOUNTER (OUTPATIENT)
Dept: LAB | Facility: MEDICAL CENTER | Age: 76
End: 2021-10-27
Attending: INTERNAL MEDICINE
Payer: MEDICARE

## 2021-10-27 DIAGNOSIS — E55.9 VITAMIN D DEFICIENCY: ICD-10-CM

## 2021-10-27 DIAGNOSIS — Z86.39 HISTORY OF HYPERPARATHYROIDISM: ICD-10-CM

## 2021-10-27 DIAGNOSIS — E78.00 HYPERCHOLESTEROLEMIA: ICD-10-CM

## 2021-10-27 LAB
25(OH)D3 SERPL-MCNC: 96 NG/ML (ref 30–100)
ALBUMIN SERPL BCP-MCNC: 4.6 G/DL (ref 3.2–4.9)
ALBUMIN/GLOB SERPL: 1.8 G/DL
ALP SERPL-CCNC: 87 U/L (ref 30–99)
ALT SERPL-CCNC: 18 U/L (ref 2–50)
ANION GAP SERPL CALC-SCNC: 9 MMOL/L (ref 7–16)
AST SERPL-CCNC: 24 U/L (ref 12–45)
BASOPHILS # BLD AUTO: 1.4 % (ref 0–1.8)
BASOPHILS # BLD: 0.09 K/UL (ref 0–0.12)
BILIRUB SERPL-MCNC: 0.6 MG/DL (ref 0.1–1.5)
BUN SERPL-MCNC: 20 MG/DL (ref 8–22)
CALCIUM SERPL-MCNC: 10.1 MG/DL (ref 8.5–10.5)
CHLORIDE SERPL-SCNC: 104 MMOL/L (ref 96–112)
CHOLEST SERPL-MCNC: 204 MG/DL (ref 100–199)
CO2 SERPL-SCNC: 24 MMOL/L (ref 20–33)
CREAT SERPL-MCNC: 0.78 MG/DL (ref 0.5–1.4)
EOSINOPHIL # BLD AUTO: 0.34 K/UL (ref 0–0.51)
EOSINOPHIL NFR BLD: 5.4 % (ref 0–6.9)
ERYTHROCYTE [DISTWIDTH] IN BLOOD BY AUTOMATED COUNT: 45.1 FL (ref 35.9–50)
FASTING STATUS PATIENT QL REPORTED: NORMAL
GLOBULIN SER CALC-MCNC: 2.6 G/DL (ref 1.9–3.5)
GLUCOSE SERPL-MCNC: 87 MG/DL (ref 65–99)
HCT VFR BLD AUTO: 42 % (ref 37–47)
HDLC SERPL-MCNC: 75 MG/DL
HGB BLD-MCNC: 13.5 G/DL (ref 12–16)
IMM GRANULOCYTES # BLD AUTO: 0.02 K/UL (ref 0–0.11)
IMM GRANULOCYTES NFR BLD AUTO: 0.3 % (ref 0–0.9)
LDLC SERPL CALC-MCNC: 104 MG/DL
LYMPHOCYTES # BLD AUTO: 2.11 K/UL (ref 1–4.8)
LYMPHOCYTES NFR BLD: 33.8 % (ref 22–41)
MCH RBC QN AUTO: 31.5 PG (ref 27–33)
MCHC RBC AUTO-ENTMCNC: 32.1 G/DL (ref 33.6–35)
MCV RBC AUTO: 97.9 FL (ref 81.4–97.8)
MONOCYTES # BLD AUTO: 0.62 K/UL (ref 0–0.85)
MONOCYTES NFR BLD AUTO: 9.9 % (ref 0–13.4)
NEUTROPHILS # BLD AUTO: 3.07 K/UL (ref 2–7.15)
NEUTROPHILS NFR BLD: 49.2 % (ref 44–72)
NRBC # BLD AUTO: 0 K/UL
NRBC BLD-RTO: 0 /100 WBC
PLATELET # BLD AUTO: 304 K/UL (ref 164–446)
PMV BLD AUTO: 9.7 FL (ref 9–12.9)
POTASSIUM SERPL-SCNC: 4.6 MMOL/L (ref 3.6–5.5)
PROT SERPL-MCNC: 7.2 G/DL (ref 6–8.2)
PTH-INTACT SERPL-MCNC: 36.8 PG/ML (ref 14–72)
RBC # BLD AUTO: 4.29 M/UL (ref 4.2–5.4)
SODIUM SERPL-SCNC: 137 MMOL/L (ref 135–145)
TRIGL SERPL-MCNC: 123 MG/DL (ref 0–149)
TSH SERPL DL<=0.005 MIU/L-ACNC: 1.61 UIU/ML (ref 0.38–5.33)
WBC # BLD AUTO: 6.3 K/UL (ref 4.8–10.8)

## 2021-10-27 PROCEDURE — 84443 ASSAY THYROID STIM HORMONE: CPT

## 2021-10-27 PROCEDURE — 80061 LIPID PANEL: CPT

## 2021-10-27 PROCEDURE — 36415 COLL VENOUS BLD VENIPUNCTURE: CPT

## 2021-10-27 PROCEDURE — 80053 COMPREHEN METABOLIC PANEL: CPT

## 2021-10-27 PROCEDURE — 85025 COMPLETE CBC W/AUTO DIFF WBC: CPT

## 2021-10-27 PROCEDURE — 83970 ASSAY OF PARATHORMONE: CPT

## 2021-10-27 PROCEDURE — 82306 VITAMIN D 25 HYDROXY: CPT

## 2021-11-04 ENCOUNTER — OFFICE VISIT (OUTPATIENT)
Dept: MEDICAL GROUP | Age: 76
End: 2021-11-04
Payer: MEDICARE

## 2021-11-04 VITALS
DIASTOLIC BLOOD PRESSURE: 58 MMHG | SYSTOLIC BLOOD PRESSURE: 118 MMHG | TEMPERATURE: 98.3 F | HEART RATE: 57 BPM | HEIGHT: 70 IN | OXYGEN SATURATION: 97 % | WEIGHT: 127.6 LBS | BODY MASS INDEX: 18.27 KG/M2

## 2021-11-04 DIAGNOSIS — Z12.11 SCREEN FOR COLON CANCER: ICD-10-CM

## 2021-11-04 DIAGNOSIS — E04.2 NON-TOXIC MULTINODULAR GOITER: ICD-10-CM

## 2021-11-04 DIAGNOSIS — E78.00 HYPERCHOLESTEROLEMIA: ICD-10-CM

## 2021-11-04 DIAGNOSIS — M85.80 OSTEOPENIA WITH HIGH RISK OF FRACTURE: ICD-10-CM

## 2021-11-04 DIAGNOSIS — M81.8 OTHER OSTEOPOROSIS WITHOUT CURRENT PATHOLOGICAL FRACTURE: ICD-10-CM

## 2021-11-04 DIAGNOSIS — F51.01 PRIMARY INSOMNIA: ICD-10-CM

## 2021-11-04 DIAGNOSIS — Z86.39 HISTORY OF HYPERPARATHYROIDISM: ICD-10-CM

## 2021-11-04 DIAGNOSIS — E55.9 VITAMIN D DEFICIENCY: ICD-10-CM

## 2021-11-04 PROBLEM — D53.9 MACROCYTIC ANEMIA: Status: RESOLVED | Noted: 2019-03-14 | Resolved: 2021-11-04

## 2021-11-04 PROCEDURE — 99214 OFFICE O/P EST MOD 30 MIN: CPT | Performed by: INTERNAL MEDICINE

## 2021-11-04 RX ORDER — CHOLECALCIFEROL (VITAMIN D3) 50 MCG
4000 TABLET ORAL DAILY
Qty: 100 TABLET | COMMUNITY
Start: 2021-11-04 | End: 2023-05-09

## 2021-11-04 RX ORDER — LATANOPROST 50 UG/ML
SOLUTION/ DROPS OPHTHALMIC
COMMUNITY
Start: 2021-08-30

## 2021-11-04 RX ORDER — BRIMONIDINE TARTRATE 1 MG/ML
SOLUTION/ DROPS OPHTHALMIC
COMMUNITY
Start: 2021-08-30

## 2021-11-04 RX ORDER — DORZOLAMIDE HYDROCHLORIDE AND TIMOLOL MALEATE 20; 5 MG/ML; MG/ML
SOLUTION/ DROPS OPHTHALMIC
COMMUNITY
Start: 2021-08-30

## 2021-11-04 ASSESSMENT — ENCOUNTER SYMPTOMS
EYES NEGATIVE: 1
RESPIRATORY NEGATIVE: 1
PSYCHIATRIC NEGATIVE: 1
CARDIOVASCULAR NEGATIVE: 1
NEUROLOGICAL NEGATIVE: 1
GASTROINTESTINAL NEGATIVE: 1
MUSCULOSKELETAL NEGATIVE: 1
CONSTITUTIONAL NEGATIVE: 1

## 2021-11-04 ASSESSMENT — FIBROSIS 4 INDEX: FIB4 SCORE: 1.43

## 2021-11-04 NOTE — PROGRESS NOTES
Subjective     Maryann Cardona is a 76 y.o. female who presents with Lab Results and Annual Exam   The patient is here for followup of chronic medical problems listed below. The patient is compliant with medications and having no side effects from them. Denies chest pain, abdominal pain, dyspnea, myalgias, or cough.   Patient Active Problem List    Diagnosis Date Noted   • Other osteoporosis without current pathological fracture 11/04/2021   • Fatigue 07/06/2020   • Non-toxic multinodular goiter 07/06/2020   • Vitamin D deficiency 07/06/2020   • Hypercholesterolemia 04/13/2017   • History of hyperparathyroidism 12/13/2016   • Osteopenia with high risk of fracture 11/03/2015   • Primary open angle glaucoma of both eyes, indeterminate stage 11/03/2015   • Primary insomnia 08/27/2015     Allergies   Allergen Reactions   • Ofloxacin Shortness of Breath, Rash and Swelling     Severe facial and neck swelling   • Vicodin [Hydrocodone-Acetaminophen] Anxiety   • Ambien [Zolpidem Tartrate]      hallucinations         Outpatient Medications Prior to Visit   Medication Sig Dispense Refill   • ALPHAGAN P 0.1 % Solution      • dorzolamide-timolol (COSOPT) 22.3-6.8 MG/ML Solution      • latanoprost (XALATAN) 0.005 % Solution      • Cholecalciferol (VITAMIN D) 2000 UNIT Tab Take 2 Tablets by mouth every day. 100 Tablet    • Zinc 50 MG Cap Take 50 mg by mouth every day.     • traZODone (DESYREL) 50 MG Tab Take 2 Tablets by mouth every bedtime. (Patient not taking: Reported on 11/4/2021) 90 tablet 3   • Cholecalciferol (VITAMIN D) 2000 UNIT Tab Take  by mouth every day.       No facility-administered medications prior to visit.                     HPI    Review of Systems   Constitutional: Negative.    HENT: Negative.    Eyes: Negative.    Respiratory: Negative.    Cardiovascular: Negative.    Gastrointestinal: Negative.    Genitourinary: Negative.    Musculoskeletal: Negative.    Skin: Negative.    Neurological: Negative.     Endo/Heme/Allergies: Negative.    Psychiatric/Behavioral: Negative.           Hospital Outpatient Visit on 10/27/2021   Component Date Value   • Pth, Intact 10/27/2021 36.8    • 25-Hydroxy   Vitamin D 25 10/27/2021 96    • WBC 10/27/2021 6.3    • RBC 10/27/2021 4.29    • Hemoglobin 10/27/2021 13.5    • Hematocrit 10/27/2021 42.0    • MCV 10/27/2021 97.9*   • MCH 10/27/2021 31.5    • MCHC 10/27/2021 32.1*   • RDW 10/27/2021 45.1    • Platelet Count 10/27/2021 304    • MPV 10/27/2021 9.7    • Neutrophils-Polys 10/27/2021 49.20    • Lymphocytes 10/27/2021 33.80    • Monocytes 10/27/2021 9.90    • Eosinophils 10/27/2021 5.40    • Basophils 10/27/2021 1.40    • Immature Granulocytes 10/27/2021 0.30    • Nucleated RBC 10/27/2021 0.00    • Neutrophils (Absolute) 10/27/2021 3.07    • Lymphs (Absolute) 10/27/2021 2.11    • Monos (Absolute) 10/27/2021 0.62    • Eos (Absolute) 10/27/2021 0.34    • Baso (Absolute) 10/27/2021 0.09    • Immature Granulocytes (a* 10/27/2021 0.02    • NRBC (Absolute) 10/27/2021 0.00    • Cholesterol,Tot 10/27/2021 204*   • Triglycerides 10/27/2021 123    • HDL 10/27/2021 75    • LDL 10/27/2021 104*   • Sodium 10/27/2021 137    • Potassium 10/27/2021 4.6    • Chloride 10/27/2021 104    • Co2 10/27/2021 24    • Anion Gap 10/27/2021 9.0    • Glucose 10/27/2021 87    • Bun 10/27/2021 20    • Creatinine 10/27/2021 0.78    • Calcium 10/27/2021 10.1    • AST(SGOT) 10/27/2021 24    • ALT(SGPT) 10/27/2021 18    • Alkaline Phosphatase 10/27/2021 87    • Total Bilirubin 10/27/2021 0.6    • Albumin 10/27/2021 4.6    • Total Protein 10/27/2021 7.2    • Globulin 10/27/2021 2.6    • A-G Ratio 10/27/2021 1.8    • TSH 10/27/2021 1.610    • Fasting Status 10/27/2021 Fasting    • GFR If  10/27/2021 >60    • GFR If Non  Ameri* 10/27/2021 >60       No results found for: HBA1C  Lab Results   Component Value Date/Time    SODIUM 137 10/27/2021 09:18 AM    POTASSIUM 4.6 10/27/2021 09:18 AM     "CHLORIDE 104 10/27/2021 09:18 AM    CO2 24 10/27/2021 09:18 AM    GLUCOSE 87 10/27/2021 09:18 AM    BUN 20 10/27/2021 09:18 AM    CREATININE 0.78 10/27/2021 09:18 AM    CREATININE 0.7 09/04/2008 11:10 PM    BUNCREATRAT 21 10/27/2015 09:43 AM    ALKPHOSPHAT 87 10/27/2021 09:18 AM    ASTSGOT 24 10/27/2021 09:18 AM    ALTSGPT 18 10/27/2021 09:18 AM    TBILIRUBIN 0.6 10/27/2021 09:18 AM     Lab Results   Component Value Date/Time    INR 0.90 03/27/2016 07:43 PM    INR 1.14 05/09/2011 04:25 AM    INR 1.12 05/07/2011 04:00 AM     Lab Results   Component Value Date/Time    CHOLSTRLTOT 204 (H) 10/27/2021 09:18 AM     (H) 10/27/2021 09:18 AM    HDL 75 10/27/2021 09:18 AM    TRIGLYCERIDE 123 10/27/2021 09:18 AM       No results found for: TESTOSTERONE  Lab Results   Component Value Date/Time    TSH 1.090 10/27/2015 09:43 AM     Lab Results   Component Value Date/Time    FREET4 1.17 10/27/2020 09:10 AM    FREET4 1.18 06/04/2020 08:56 AM     No results found for: URICACID  No components found for: VITB12  Lab Results   Component Value Date/Time    25HYDROXY 96 10/27/2021 09:18 AM    25HYDROXY 89 10/27/2020 09:08 AM         Objective     /58 (BP Location: Right arm, Patient Position: Sitting, BP Cuff Size: Adult)   Pulse (!) 57   Temp 36.8 °C (98.3 °F) (Temporal)   Ht 1.778 m (5' 10\")   Wt 57.9 kg (127 lb 9.6 oz)   SpO2 97%   BMI 18.31 kg/m²      Physical Exam  Vitals reviewed.   Constitutional:       General: She is not in acute distress.     Appearance: She is well-developed. She is not diaphoretic.   HENT:      Head: Normocephalic and atraumatic.      Right Ear: External ear normal.      Left Ear: External ear normal.      Nose: Nose normal.      Mouth/Throat:      Pharynx: No oropharyngeal exudate.   Eyes:      General: No scleral icterus.        Right eye: No discharge.         Left eye: No discharge.      Conjunctiva/sclera: Conjunctivae normal.      Pupils: Pupils are equal, round, and reactive to " light.   Neck:      Thyroid: No thyromegaly.      Vascular: No JVD.      Trachea: No tracheal deviation.   Cardiovascular:      Rate and Rhythm: Normal rate and regular rhythm.      Heart sounds: Normal heart sounds. No murmur heard.   No friction rub. No gallop.    Pulmonary:      Effort: Pulmonary effort is normal. No respiratory distress.      Breath sounds: Normal breath sounds. No stridor. No wheezing or rales.   Chest:      Chest wall: No tenderness.   Abdominal:      General: Bowel sounds are normal. There is no distension.      Palpations: Abdomen is soft. There is no mass.      Tenderness: There is no abdominal tenderness. There is no guarding or rebound.   Musculoskeletal:         General: No tenderness. Normal range of motion.      Cervical back: Normal range of motion and neck supple.   Lymphadenopathy:      Cervical: No cervical adenopathy.   Skin:     General: Skin is warm and dry.      Coloration: Skin is not pale.      Findings: No erythema or rash.   Neurological:      Mental Status: She is alert and oriented to person, place, and time.      Cranial Nerves: No cranial nerve deficit.      Motor: No abnormal muscle tone.      Coordination: Coordination normal.      Deep Tendon Reflexes: Reflexes are normal and symmetric. Reflexes normal.   Psychiatric:         Behavior: Behavior normal.         Thought Content: Thought content normal.         Judgment: Judgment normal.                             Assessment & Plan        1. Hypercholesterolemia   Under good control. Continue same regimen.    2. Vitamin D deficiency     Under good control. Continue same regimen.     3. Primary insomnia    Under good control. Continue same regimen.  4. History of hyperparathyroidism     Under good control. Continue same regimen.    5. Non-toxic multinodular goiter    Under good control. Continue same regimen.    6. Ost.eopenia with high risk of fracture    Under good control. Continue same regimen  - Cholecalciferol  (VITAMIN D) 2000 UNIT Tab; Take 2 Tablets by mouth every day.  Dispense: 100 Tablet  - DS-BONE DENSITY STUDY (DEXA); Future    7. Other osteoporosis without current pathological fracture  Patient good bone density done in March 2022 and if osteoporosis persist will accept to Prolia injections every 6 months.  She declines Fosamax. - DS-BONE DENSITY STUDY (DEXA); Future    8. Screen for colon cancer Patient requests referral to GI for colonoscopy.        Assuming this to be for colon cancer screening.  - Referral to Gastroenterology

## 2022-03-01 ENCOUNTER — APPOINTMENT (OUTPATIENT)
Dept: RADIOLOGY | Facility: MEDICAL CENTER | Age: 77
End: 2022-03-01
Attending: INTERNAL MEDICINE
Payer: MEDICARE

## 2022-03-02 ENCOUNTER — HOSPITAL ENCOUNTER (OUTPATIENT)
Dept: RADIOLOGY | Facility: MEDICAL CENTER | Age: 77
End: 2022-03-02
Attending: INTERNAL MEDICINE
Payer: MEDICARE

## 2022-03-02 DIAGNOSIS — M85.80 OSTEOPENIA WITH HIGH RISK OF FRACTURE: ICD-10-CM

## 2022-03-02 DIAGNOSIS — M81.8 OTHER OSTEOPOROSIS WITHOUT CURRENT PATHOLOGICAL FRACTURE: ICD-10-CM

## 2022-03-02 PROCEDURE — 77080 DXA BONE DENSITY AXIAL: CPT

## 2022-04-06 ENCOUNTER — HOSPITAL ENCOUNTER (OUTPATIENT)
Dept: RADIOLOGY | Facility: MEDICAL CENTER | Age: 77
End: 2022-04-06
Attending: INTERNAL MEDICINE
Payer: MEDICARE

## 2022-04-06 DIAGNOSIS — Z12.31 VISIT FOR SCREENING MAMMOGRAM: ICD-10-CM

## 2022-04-06 PROCEDURE — 77063 BREAST TOMOSYNTHESIS BI: CPT

## 2022-05-12 ENCOUNTER — OFFICE VISIT (OUTPATIENT)
Dept: MEDICAL GROUP | Age: 77
End: 2022-05-12
Payer: MEDICARE

## 2022-05-12 VITALS
BODY MASS INDEX: 18.24 KG/M2 | WEIGHT: 127.4 LBS | HEIGHT: 70 IN | TEMPERATURE: 97.4 F | DIASTOLIC BLOOD PRESSURE: 62 MMHG | SYSTOLIC BLOOD PRESSURE: 104 MMHG | OXYGEN SATURATION: 100 % | HEART RATE: 61 BPM | RESPIRATION RATE: 16 BRPM

## 2022-05-12 DIAGNOSIS — M81.8 OTHER OSTEOPOROSIS WITHOUT CURRENT PATHOLOGICAL FRACTURE: ICD-10-CM

## 2022-05-12 DIAGNOSIS — R73.01 IFG (IMPAIRED FASTING GLUCOSE): ICD-10-CM

## 2022-05-12 DIAGNOSIS — E04.2 NON-TOXIC MULTINODULAR GOITER: ICD-10-CM

## 2022-05-12 DIAGNOSIS — E55.9 VITAMIN D DEFICIENCY: ICD-10-CM

## 2022-05-12 DIAGNOSIS — E78.00 HYPERCHOLESTEROLEMIA: ICD-10-CM

## 2022-05-12 PROCEDURE — 99214 OFFICE O/P EST MOD 30 MIN: CPT | Performed by: INTERNAL MEDICINE

## 2022-05-12 ASSESSMENT — ENCOUNTER SYMPTOMS
RESPIRATORY NEGATIVE: 1
NEUROLOGICAL NEGATIVE: 1
CARDIOVASCULAR NEGATIVE: 1
PSYCHIATRIC NEGATIVE: 1
CONSTITUTIONAL NEGATIVE: 1
EYES NEGATIVE: 1
MUSCULOSKELETAL NEGATIVE: 1
GASTROINTESTINAL NEGATIVE: 1

## 2022-05-12 ASSESSMENT — PATIENT HEALTH QUESTIONNAIRE - PHQ9: CLINICAL INTERPRETATION OF PHQ2 SCORE: 0

## 2022-05-12 ASSESSMENT — FIBROSIS 4 INDEX: FIB4 SCORE: 1.45

## 2022-05-12 NOTE — PROGRESS NOTES
Subjective     Maryann Cardona is a 77 y.o. female who presents with Follow-Up (Dexa results )  The patient is here for followup of chronic medical problems listed below. The patient is compliant with medications and having no side effects from them. Denies chest pain, abdominal pain, dyspnea, myalgias, or cough.    .  Since the patient's last visit she been doing well with no complaints.  She is exercising regularly staying healthy staying engaged doing outdoor activities eating sensibly and keep her weight under excellent control.  She does have osteopenia in the spine and osteoporosis in the femur but both of these improved on DEXA scan most recently.  She will continue her vitamin D and calcium replacement and avoiding biphosphonate's or other osteoporosis treating drugs.          Patient Active Problem List    Diagnosis Date Noted   • History of hyperparathyroidism 12/13/2016     Priority: Low   • Other osteoporosis without current pathological fracture 11/04/2021   • Fatigue 07/06/2020   • Non-toxic multinodular goiter 07/06/2020   • Vitamin D deficiency 07/06/2020   • Hypercholesterolemia 04/13/2017   • Osteopenia with high risk of fracture 11/03/2015   • Primary open angle glaucoma of both eyes, indeterminate stage 11/03/2015   • Primary insomnia 08/27/2015     Allergies   Allergen Reactions   • Ofloxacin Shortness of Breath, Rash and Swelling     Severe facial and neck swelling   • Vicodin [Hydrocodone-Acetaminophen] Anxiety   • Ambien [Zolpidem Tartrate]      hallucinations     Outpatient Medications Prior to Visit   Medication Sig Dispense Refill   • ALPHAGAN P 0.1 % Solution      • dorzolamide-timolol (COSOPT) 22.3-6.8 MG/ML Solution      • latanoprost (XALATAN) 0.005 % Solution      • Cholecalciferol (VITAMIN D) 2000 UNIT Tab Take 2 Tablets by mouth every day. 100 Tablet    • Zinc 50 MG Cap Take 50 mg by mouth every day.       No facility-administered medications prior to visit.     No visits with  results within 1 Month(s) from this visit.   Latest known visit with results is:   Hospital Outpatient Visit on 10/27/2021   Component Date Value   • Pth, Intact 10/27/2021 36.8    • 25-Hydroxy   Vitamin D 25 10/27/2021 96    • WBC 10/27/2021 6.3    • RBC 10/27/2021 4.29    • Hemoglobin 10/27/2021 13.5    • Hematocrit 10/27/2021 42.0    • MCV 10/27/2021 97.9 (A)   • MCH 10/27/2021 31.5    • MCHC 10/27/2021 32.1 (A)   • RDW 10/27/2021 45.1    • Platelet Count 10/27/2021 304    • MPV 10/27/2021 9.7    • Neutrophils-Polys 10/27/2021 49.20    • Lymphocytes 10/27/2021 33.80    • Monocytes 10/27/2021 9.90    • Eosinophils 10/27/2021 5.40    • Basophils 10/27/2021 1.40    • Immature Granulocytes 10/27/2021 0.30    • Nucleated RBC 10/27/2021 0.00    • Neutrophils (Absolute) 10/27/2021 3.07    • Lymphs (Absolute) 10/27/2021 2.11    • Monos (Absolute) 10/27/2021 0.62    • Eos (Absolute) 10/27/2021 0.34    • Baso (Absolute) 10/27/2021 0.09    • Immature Granulocytes (a* 10/27/2021 0.02    • NRBC (Absolute) 10/27/2021 0.00    • Cholesterol,Tot 10/27/2021 204 (A)   • Triglycerides 10/27/2021 123    • HDL 10/27/2021 75    • LDL 10/27/2021 104 (A)   • Sodium 10/27/2021 137    • Potassium 10/27/2021 4.6    • Chloride 10/27/2021 104    • Co2 10/27/2021 24    • Anion Gap 10/27/2021 9.0    • Glucose 10/27/2021 87    • Bun 10/27/2021 20    • Creatinine 10/27/2021 0.78    • Calcium 10/27/2021 10.1    • AST(SGOT) 10/27/2021 24    • ALT(SGPT) 10/27/2021 18    • Alkaline Phosphatase 10/27/2021 87    • Total Bilirubin 10/27/2021 0.6    • Albumin 10/27/2021 4.6    • Total Protein 10/27/2021 7.2    • Globulin 10/27/2021 2.6    • A-G Ratio 10/27/2021 1.8    • TSH 10/27/2021 1.610    • Fasting Status 10/27/2021 Fasting    • GFR If  10/27/2021 >60    • GFR If Non  Ameri* 10/27/2021 >60       No results found for: HBA1C  Lab Results   Component Value Date/Time    SODIUM 137 10/27/2021 09:18 AM    POTASSIUM 4.6 10/27/2021  "09:18 AM    CHLORIDE 104 10/27/2021 09:18 AM    CO2 24 10/27/2021 09:18 AM    GLUCOSE 87 10/27/2021 09:18 AM    BUN 20 10/27/2021 09:18 AM    CREATININE 0.78 10/27/2021 09:18 AM    CREATININE 0.7 09/04/2008 11:10 PM    BUNCREATRAT 21 10/27/2015 09:43 AM    ALKPHOSPHAT 87 10/27/2021 09:18 AM    ASTSGOT 24 10/27/2021 09:18 AM    ALTSGPT 18 10/27/2021 09:18 AM    TBILIRUBIN 0.6 10/27/2021 09:18 AM     Lab Results   Component Value Date/Time    INR 0.90 03/27/2016 07:43 PM    INR 1.14 05/09/2011 04:25 AM    INR 1.12 05/07/2011 04:00 AM     Lab Results   Component Value Date/Time    CHOLSTRLTOT 204 (H) 10/27/2021 09:18 AM     (H) 10/27/2021 09:18 AM    HDL 75 10/27/2021 09:18 AM    TRIGLYCERIDE 123 10/27/2021 09:18 AM       No results found for: TESTOSTERONE  Lab Results   Component Value Date/Time    TSH 1.090 10/27/2015 09:43 AM     Lab Results   Component Value Date/Time    FREET4 1.17 10/27/2020 09:10 AM    FREET4 1.18 06/04/2020 08:56 AM     No results found for: URICACID  No components found for: VITB12  Lab Results   Component Value Date/Time    25HYDROXY 96 10/27/2021 09:18 AM    25HYDROXY 89 10/27/2020 09:08 AM               HPI    Review of Systems   Constitutional: Negative.    HENT: Negative.    Eyes: Negative.    Respiratory: Negative.    Cardiovascular: Negative.    Gastrointestinal: Negative.    Genitourinary: Negative.    Musculoskeletal: Negative.    Skin: Negative.    Neurological: Negative.    Endo/Heme/Allergies: Negative.    Psychiatric/Behavioral: Negative.               Objective     /62 (BP Location: Left arm, Patient Position: Sitting, BP Cuff Size: Adult)   Pulse 61   Temp 36.3 °C (97.4 °F) (Temporal)   Resp 16   Ht 1.778 m (5' 10\")   Wt 57.8 kg (127 lb 6.4 oz)   SpO2 100%   BMI 18.28 kg/m²      Physical Exam  Vitals reviewed.   Constitutional:       General: She is not in acute distress.     Appearance: She is well-developed. She is not diaphoretic.   HENT:      Head: " Normocephalic and atraumatic.      Right Ear: External ear normal.      Left Ear: External ear normal.      Nose: Nose normal.      Mouth/Throat:      Pharynx: No oropharyngeal exudate.   Eyes:      General: No scleral icterus.        Right eye: No discharge.         Left eye: No discharge.      Conjunctiva/sclera: Conjunctivae normal.      Pupils: Pupils are equal, round, and reactive to light.   Neck:      Thyroid: No thyromegaly.      Vascular: No JVD.      Trachea: No tracheal deviation.   Cardiovascular:      Rate and Rhythm: Normal rate and regular rhythm.      Heart sounds: Normal heart sounds. No murmur heard.    No friction rub. No gallop.   Pulmonary:      Effort: Pulmonary effort is normal. No respiratory distress.      Breath sounds: Normal breath sounds. No stridor. No wheezing or rales.   Chest:      Chest wall: No tenderness.   Abdominal:      General: Bowel sounds are normal. There is no distension.      Palpations: Abdomen is soft. There is no mass.      Tenderness: There is no abdominal tenderness. There is no guarding or rebound.   Musculoskeletal:         General: No tenderness. Normal range of motion.      Cervical back: Normal range of motion and neck supple.   Lymphadenopathy:      Cervical: No cervical adenopathy.   Skin:     General: Skin is warm and dry.      Coloration: Skin is not pale.      Findings: No erythema or rash.   Neurological:      Mental Status: She is alert and oriented to person, place, and time.      Cranial Nerves: No cranial nerve deficit.      Motor: No abnormal muscle tone.      Coordination: Coordination normal.      Deep Tendon Reflexes: Reflexes are normal and symmetric. Reflexes normal.   Psychiatric:         Behavior: Behavior normal.         Thought Content: Thought content normal.         Judgment: Judgment normal.                           DS-BONE DENSITY STUDY (DEXA)  Order: [id number]   Status: Final result     Visible to patient: Yes (seen)     Next appt:  11/07/2022 at 02:00 PM [facility] (Hardeep Kirkpatrick M.D.)     Dx: Osteopenia with high risk of fracture...     0 Result Notes     1 HM Topic    Details    Reading Physician Reading Date Result Priority   Deacon Miller M.D.  [phone] 3/2/2022 Routine     Narrative & Impression     3/2/2022 9:31 AM     HISTORY/REASON FOR EXAM:  Osteopenic L1 and L2 levels, osteoporotic proximal left femur, postmenopausal, hysterectomy, adult bone fracture, glaucoma, family history of osteoporosis, low back surgery.     TECHNIQUE/EXAM DESCRIPTION AND NUMBER OF VIEWS:  Dual x-ray bone densitometry was performed from the L1 and L2 levels and from the proximal left femur utilizing the GE Prodigy unit.     COMPARISON: Bone densitometry scan 2/28/2020.     FINDINGS:  The lumbar spine has a mean bone mineral density of 0.933 g/cm2, with a T score of -1.9 and a Z score of 0.1.     The proximal left femur has a mean bone mineral density of 0.693 g/cm2, with a T score of -2.5 and a Z score of -0.5.     When compared with the most recent study dated 2/28/2020, there has been a 7.0% increase in the bone mineral density of the lumbar spine and a 2.2% increase in the bone mineral density of the proximal left femur.     IMPRESSION:     According to the World Health Organization classification, bone mineral density of this patient is consistent with osteopenia in the lumbar spine and osteoporosis in the proximal left femur.     10-year Probability of Fracture:  Major Osteoporotic     21.5%  Hip     7.3%  Population      [place] ()     Based on left femur neck BMD     INTERPRETING LOCATION: [address] [place] [address] [place], [address]             Exam Ended: 03/02/22  9:46 AM Last Resulted: 03/02/22 10:28 AM   Jamil as an Unsuccessful Attempt       Order Details     View Encounter     Lab and Collection Details     Routing     Result History             Result Care Coordination      Patient Communication    Add Comments  Seen Back to  Top          Satisfied Health Maintenance Topics       Back to Top  BONE DENSITY (Every 2 Years)  Next due on 3/2/2024  Address Topic             DS-BONE DENSITY STUDY (DEXA) [[id number]]    Electronically signed by: Hardeep Kirkpatrick M.D. on 11/04/21 1325 Status: Completed   Ordering user: Hardeep Kirkpatrick M.D. 11/04/21 1325 Authorized by: Hardeep Kirkpatrick M.D.    Add Signature Requirement   Frequency:  03/02/22 0923 - 1  occurrence   Diagnoses   Osteopenia with high risk of fracture [M85.80] (Recent DEXA scan show bone density improvement to Osteopenia, but she still has high fracture risk. She refused to be treated with bisphosphonates or alternatin)   Other osteoporosis without current pathological fracture [M81.8]     Questionnaire    Question Answer   Reason for Exam osteoporosis       Assessment & Plan        1. Hypercholesterolemia  Is under good control with diet and exercise.  No medications.  Continue unchanged continue vitamin D supplements.  Doing well.  - TSH; Future  - Comp Metabolic Panel; Future  - Lipid Profile; Future  - CBC WITH DIFFERENTIAL; Future  - HEMOGLOBIN A1C; Future  - VITAMIN D,25 HYDROXY; Future    2. Vitamin D deficiency  Continue on vitamin D supplements.  Doing well.- VITAMIN D,25 HYDROXY; Future    3. Non-toxic multinodular goiter        Continue surveillance with thyroid ultrasounds every other year.  Exam of her neck shows no significant nodules.    4. Other osteoporosis without current pathological fracture     Under good control with vitamin D and calcium.  Continue surveillance.  5. IFG (impaired fasting glucose)      Continue with Mediterranean diet and exercise.  - HEMOGLOBIN A1C; Future

## 2022-10-14 ENCOUNTER — APPOINTMENT (RX ONLY)
Dept: URBAN - METROPOLITAN AREA CLINIC 108 | Facility: CLINIC | Age: 77
Setting detail: DERMATOLOGY
End: 2022-10-14

## 2022-10-14 DIAGNOSIS — L21.8 OTHER SEBORRHEIC DERMATITIS: ICD-10-CM

## 2022-10-14 PROBLEM — Z92.89 PERSONAL HISTORY OF OTHER MEDICAL TREATMENT: Status: ACTIVE | Noted: 2022-10-14

## 2022-10-14 PROCEDURE — 99203 OFFICE O/P NEW LOW 30 MIN: CPT | Mod: GT

## 2022-10-14 PROCEDURE — ? COUNSELING

## 2022-10-14 PROCEDURE — ? PRESCRIPTION

## 2022-10-14 PROCEDURE — ? TELEHEALTH ASSESSMENT

## 2022-10-14 RX ORDER — HYDROCORTISONE 25 MG/G
CREAM TOPICAL BID
Qty: 30 | Refills: 2 | Status: ERX | COMMUNITY
Start: 2022-10-14

## 2022-10-14 RX ORDER — KETOCONAZOLE 20 MG/G
CREAM TOPICAL BID
Qty: 30 | Refills: 3 | Status: ERX | COMMUNITY
Start: 2022-10-14

## 2022-10-14 RX ADMIN — KETOCONAZOLE: 20 CREAM TOPICAL at 00:00

## 2022-10-14 RX ADMIN — HYDROCORTISONE: 25 CREAM TOPICAL at 00:00

## 2022-10-14 NOTE — PROCEDURE: MIPS QUALITY
Quality 402: Tobacco Use And Help With Quitting Among Adolescents: Patient screened for tobacco and is an ex-smoker
Additional Notes: Patient advised to update medications in patient portal
Quality 111:Pneumonia Vaccination Status For Older Adults: Documentation of medical reason(s) for not administering pneumococcal vaccine (e.g., adverse reaction to vaccine)
Quality 130: Documentation Of Current Medications In The Medical Record: Documentation of a medical reason(s) for not documenting, updating, or reviewing the patientâs current medications list (e.g., patient is in an urgent or emergent medical situation)
Detail Level: Detailed

## 2022-10-14 NOTE — PROCEDURE: TELEHEALTH ASSESSMENT
Detail Level: Simple
Recommendation Preamble: Assessment:
Assessment (Free Text): The patient verified their identity with their photo ID and consented to evaluation and management of their medical condition through telehealth. This visit was conducted in real-time with an audio and video platform, [email or web address] me during the [address] during a state of national emergency. This telehealth visit was medically necessary to prevent the community spread of COVID-19. \\n\\nThe patient is aware that we will bill their insurance for this telehealth visit following insurance guidelines. \\n\\nFace to face time with the patient was 15 minutes. \\n\\nConsent:\\nPatient consented to the following prior to the visit: \"I consent and understand that I am initiating a synchronous video health session with my healthcare provider and will be asked to confirm my identity with photo identification. I understand that there are potential risks to this technology, including interruptions, potential data breaches, and technical difficulties. Poor video quality may interfere with my healthcare providerâs ability to accurately diagnose my condition. I understand that my health care provider or I can discontinue the telemedicine consult/visit if it is felt that the videoconferencing connections are not adequate for the situation. I also understand that my insurance carrier will be billed for healthcare services rendered. \"

## 2022-10-21 ENCOUNTER — APPOINTMENT (RX ONLY)
Dept: URBAN - METROPOLITAN AREA CLINIC 108 | Facility: CLINIC | Age: 77
Setting detail: DERMATOLOGY
End: 2022-10-21

## 2022-10-21 DIAGNOSIS — R21 RASH AND OTHER NONSPECIFIC SKIN ERUPTION: ICD-10-CM

## 2022-10-21 PROCEDURE — 11105 PUNCH BX SKIN EA SEP/ADDL: CPT

## 2022-10-21 PROCEDURE — ? PRESCRIPTION

## 2022-10-21 PROCEDURE — ? BIOPSY BY PUNCH METHOD

## 2022-10-21 PROCEDURE — 11104 PUNCH BX SKIN SINGLE LESION: CPT

## 2022-10-21 RX ORDER — TRIAMCINOLONE ACETONIDE 1 MG/G
1 CREAM TOPICAL BID
Qty: 454 | Refills: 3 | Status: ERX | COMMUNITY
Start: 2022-10-21

## 2022-10-21 RX ADMIN — TRIAMCINOLONE ACETONIDE 1: 1 CREAM TOPICAL at 00:00

## 2022-10-21 ASSESSMENT — SEVERITY ASSESSMENT: SEVERITY: MODERATE TO SEVERE

## 2022-10-21 ASSESSMENT — LOCATION DETAILED DESCRIPTION DERM
LOCATION DETAILED: LEFT ANTERIOR PROXIMAL THIGH
LOCATION DETAILED: LEFT PROXIMAL DORSAL FOREARM

## 2022-10-21 ASSESSMENT — LOCATION SIMPLE DESCRIPTION DERM
LOCATION SIMPLE: LEFT THIGH
LOCATION SIMPLE: LEFT FOREARM

## 2022-10-21 ASSESSMENT — LOCATION ZONE DERM
LOCATION ZONE: ARM
LOCATION ZONE: LEG

## 2022-10-21 ASSESSMENT — BSA RASH: BSA RASH: 20

## 2022-10-21 NOTE — PROCEDURE: BIOPSY BY PUNCH METHOD
Detail Level: Detailed
Was A Bandage Applied: Yes
Punch Size In Mm: 4
Biopsy Type: H and E
Anesthesia Type: 1% lidocaine with epinephrine
Anesthesia Volume In Cc (Will Not Render If 0): 1
Additional Anesthesia Volume In Cc (Will Not Render If 0): 0
Hemostasis: None
Epidermal Sutures: 4-0 Prolene
Number Of Epidermal Sutures (Optional): 2
Wound Care: Bacitracin
Dressing: bandage
Patient Will Remove Sutures At Home?: No
Lab: [facility]
Lab Facility: [address] [facility]
Path Notes (To The Dermatopathologist): Christiano Wu
Consent: Written consent was obtained and risks were reviewed including but not limited to scarring, infection, bleeding, scabbing, incomplete removal, nerve damage and allergy to anesthesia.
Post-Care Instructions: I reviewed with the patient in detail post-care instructions. Patient is to keep the biopsy site dry overnight, and then apply bacitracin twice daily until healed. Patient may apply hydrogen peroxide soaks to remove any crusting.
Home Suture Removal Text: Patient was provided a home suture removal kit and will remove their sutures at home. If they have any questions or difficulties they will call the office.
Notification Instructions: Patient will be notified of biopsy results. However, patient instructed to call the office if not contacted within 2 weeks.
Billing Type: [employer]
Information: Selecting Yes will display possible errors in your note based on the variables you have selected. This validation is only offered as a suggestion for you. PLEASE NOTE THAT THE VALIDATION TEXT WILL BE REMOVED WHEN YOU FINALIZE YOUR NOTE. IF YOU WANT TO FAX A PRELIMINARY NOTE YOU WILL NEED TO TOGGLE THIS TO 'NO' IF YOU DO NOT WANT IT IN YOUR FAXED NOTE.
Biopsy Type: DIF
Lab: [id number]
Lab Facility: [id number]
Path Notes (To The Dermatopathologist): Eczematous and papular rash on elbows, neck, inner thighs, upper buttocks, and face. Not responding to topical steroids or oral prednisone.
Home Suture Removal Text: Patient was provided a home suture removal kit and will remove their sutures at home.  If they have any questions or difficulties they will call the office.
Billing Type: Third-Party Bill

## 2022-11-01 ENCOUNTER — HOSPITAL ENCOUNTER (OUTPATIENT)
Dept: LAB | Facility: MEDICAL CENTER | Age: 77
End: 2022-11-01
Attending: INTERNAL MEDICINE
Payer: MEDICARE

## 2022-11-01 DIAGNOSIS — R73.01 IFG (IMPAIRED FASTING GLUCOSE): ICD-10-CM

## 2022-11-01 DIAGNOSIS — E55.9 VITAMIN D DEFICIENCY: ICD-10-CM

## 2022-11-01 DIAGNOSIS — E78.00 HYPERCHOLESTEROLEMIA: ICD-10-CM

## 2022-11-01 LAB
25(OH)D3 SERPL-MCNC: 112 NG/ML (ref 30–100)
BASOPHILS # BLD AUTO: 1.1 % (ref 0–1.8)
BASOPHILS # BLD: 0.07 K/UL (ref 0–0.12)
EOSINOPHIL # BLD AUTO: 0.41 K/UL (ref 0–0.51)
EOSINOPHIL NFR BLD: 6.6 % (ref 0–6.9)
ERYTHROCYTE [DISTWIDTH] IN BLOOD BY AUTOMATED COUNT: 45.8 FL (ref 35.9–50)
EST. AVERAGE GLUCOSE BLD GHB EST-MCNC: 108 MG/DL
HBA1C MFR BLD: 5.4 % (ref 4–5.6)
HCT VFR BLD AUTO: 41 % (ref 37–47)
HGB BLD-MCNC: 13.6 G/DL (ref 12–16)
IMM GRANULOCYTES # BLD AUTO: 0.02 K/UL (ref 0–0.11)
IMM GRANULOCYTES NFR BLD AUTO: 0.3 % (ref 0–0.9)
LYMPHOCYTES # BLD AUTO: 1.99 K/UL (ref 1–4.8)
LYMPHOCYTES NFR BLD: 31.9 % (ref 22–41)
MCH RBC QN AUTO: 32.4 PG (ref 27–33)
MCHC RBC AUTO-ENTMCNC: 33.2 G/DL (ref 33.6–35)
MCV RBC AUTO: 97.6 FL (ref 81.4–97.8)
MONOCYTES # BLD AUTO: 0.5 K/UL (ref 0–0.85)
MONOCYTES NFR BLD AUTO: 8 % (ref 0–13.4)
NEUTROPHILS # BLD AUTO: 3.25 K/UL (ref 2–7.15)
NEUTROPHILS NFR BLD: 52.1 % (ref 44–72)
NRBC # BLD AUTO: 0 K/UL
NRBC BLD-RTO: 0 /100 WBC
PLATELET # BLD AUTO: 287 K/UL (ref 164–446)
PMV BLD AUTO: 9.4 FL (ref 9–12.9)
RBC # BLD AUTO: 4.2 M/UL (ref 4.2–5.4)
TSH SERPL DL<=0.005 MIU/L-ACNC: 0.89 UIU/ML (ref 0.38–5.33)
WBC # BLD AUTO: 6.2 K/UL (ref 4.8–10.8)

## 2022-11-01 PROCEDURE — 80061 LIPID PANEL: CPT

## 2022-11-01 PROCEDURE — 84443 ASSAY THYROID STIM HORMONE: CPT

## 2022-11-01 PROCEDURE — 36415 COLL VENOUS BLD VENIPUNCTURE: CPT

## 2022-11-01 PROCEDURE — 80053 COMPREHEN METABOLIC PANEL: CPT

## 2022-11-01 PROCEDURE — 82306 VITAMIN D 25 HYDROXY: CPT

## 2022-11-01 PROCEDURE — 83036 HEMOGLOBIN GLYCOSYLATED A1C: CPT | Mod: GA

## 2022-11-01 PROCEDURE — 85025 COMPLETE CBC W/AUTO DIFF WBC: CPT

## 2022-11-02 LAB
ALBUMIN SERPL BCP-MCNC: 4.4 G/DL (ref 3.2–4.9)
ALBUMIN/GLOB SERPL: 1.8 G/DL
ALP SERPL-CCNC: 70 U/L (ref 30–99)
ALT SERPL-CCNC: 18 U/L (ref 2–50)
ANION GAP SERPL CALC-SCNC: 11 MMOL/L (ref 7–16)
AST SERPL-CCNC: 25 U/L (ref 12–45)
BILIRUB SERPL-MCNC: 0.6 MG/DL (ref 0.1–1.5)
BUN SERPL-MCNC: 26 MG/DL (ref 8–22)
CALCIUM SERPL-MCNC: 9.8 MG/DL (ref 8.5–10.5)
CHLORIDE SERPL-SCNC: 103 MMOL/L (ref 96–112)
CHOLEST SERPL-MCNC: 182 MG/DL (ref 100–199)
CO2 SERPL-SCNC: 23 MMOL/L (ref 20–33)
CREAT SERPL-MCNC: 0.73 MG/DL (ref 0.5–1.4)
FASTING STATUS PATIENT QL REPORTED: NORMAL
GFR SERPLBLD CREATININE-BSD FMLA CKD-EPI: 84 ML/MIN/1.73 M 2
GLOBULIN SER CALC-MCNC: 2.4 G/DL (ref 1.9–3.5)
GLUCOSE SERPL-MCNC: 94 MG/DL (ref 65–99)
HDLC SERPL-MCNC: 67 MG/DL
LDLC SERPL CALC-MCNC: 90 MG/DL
POTASSIUM SERPL-SCNC: 4.2 MMOL/L (ref 3.6–5.5)
PROT SERPL-MCNC: 6.8 G/DL (ref 6–8.2)
SODIUM SERPL-SCNC: 137 MMOL/L (ref 135–145)
TRIGL SERPL-MCNC: 124 MG/DL (ref 0–149)

## 2022-11-07 ENCOUNTER — OFFICE VISIT (OUTPATIENT)
Dept: MEDICAL GROUP | Age: 77
End: 2022-11-07
Payer: MEDICARE

## 2022-11-07 VITALS
RESPIRATION RATE: 16 BRPM | DIASTOLIC BLOOD PRESSURE: 60 MMHG | HEIGHT: 70 IN | BODY MASS INDEX: 17.75 KG/M2 | TEMPERATURE: 97.3 F | WEIGHT: 124 LBS | SYSTOLIC BLOOD PRESSURE: 110 MMHG | OXYGEN SATURATION: 98 % | HEART RATE: 56 BPM

## 2022-11-07 DIAGNOSIS — E78.00 HYPERCHOLESTEROLEMIA: ICD-10-CM

## 2022-11-07 DIAGNOSIS — E53.8 VITAMIN B 12 DEFICIENCY: ICD-10-CM

## 2022-11-07 DIAGNOSIS — F51.01 PRIMARY INSOMNIA: ICD-10-CM

## 2022-11-07 DIAGNOSIS — E55.9 VITAMIN D DEFICIENCY: ICD-10-CM

## 2022-11-07 DIAGNOSIS — R53.83 FATIGUE, UNSPECIFIED TYPE: ICD-10-CM

## 2022-11-07 DIAGNOSIS — R73.01 IFG (IMPAIRED FASTING GLUCOSE): ICD-10-CM

## 2022-11-07 DIAGNOSIS — E04.2 NON-TOXIC MULTINODULAR GOITER: ICD-10-CM

## 2022-11-07 DIAGNOSIS — Z86.39 HISTORY OF HYPERPARATHYROIDISM: ICD-10-CM

## 2022-11-07 PROCEDURE — 99214 OFFICE O/P EST MOD 30 MIN: CPT | Performed by: INTERNAL MEDICINE

## 2022-11-07 ASSESSMENT — ENCOUNTER SYMPTOMS
EYES NEGATIVE: 1
NEUROLOGICAL NEGATIVE: 1
CONSTITUTIONAL NEGATIVE: 1
RESPIRATORY NEGATIVE: 1
MUSCULOSKELETAL NEGATIVE: 1
PSYCHIATRIC NEGATIVE: 1
CARDIOVASCULAR NEGATIVE: 1
GASTROINTESTINAL NEGATIVE: 1

## 2022-11-07 ASSESSMENT — FIBROSIS 4 INDEX: FIB4 SCORE: 1.6

## 2022-11-08 NOTE — PROGRESS NOTES
Subjective     Maryann Cardona is a 77 y.o. female who presents with Follow-Up (Lab review )  The patient is here for followup of chronic medical problems listed below. The patient is compliant with medications and having no side effects from them. Denies chest pain, abdominal pain, dyspnea, myalgias, or cough.   Patient Active Problem List   Diagnosis    Primary insomnia    Osteopenia with high risk of fracture    Primary open angle glaucoma of both eyes, indeterminate stage    History of hyperparathyroidism    Hypercholesterolemia    Fatigue    Non-toxic multinodular goiter    Vitamin D deficiency    Other osteoporosis without current pathological fracture    Vitamin B 12 deficiency     Allergies   Allergen Reactions    Ofloxacin Shortness of Breath, Rash and Swelling     Severe facial and neck swelling    Vicodin [Hydrocodone-Acetaminophen] Anxiety    Ambien [Zolpidem Tartrate]      hallucinations     11/07/2022    Lab Results   Component Value Date/Time    HBA1C 5.4 11/01/2022 01:52 PM     Lab Results   Component Value Date/Time    SODIUM 137 11/01/2022 01:52 PM    POTASSIUM 4.2 11/01/2022 01:52 PM    CHLORIDE 103 11/01/2022 01:52 PM    CO2 23 11/01/2022 01:52 PM    GLUCOSE 94 11/01/2022 01:52 PM    BUN 26 (H) 11/01/2022 01:52 PM    CREATININE 0.73 11/01/2022 01:52 PM    CREATININE 0.7 09/04/2008 11:10 PM    BUNCREATRAT 21 10/27/2015 09:43 AM    ALKPHOSPHAT 70 11/01/2022 01:52 PM    ASTSGOT 25 11/01/2022 01:52 PM    ALTSGPT 18 11/01/2022 01:52 PM    TBILIRUBIN 0.6 11/01/2022 01:52 PM     Lab Results   Component Value Date/Time    INR 0.90 03/27/2016 07:43 PM    INR 1.14 05/09/2011 04:25 AM    INR 1.12 05/07/2011 04:00 AM     Lab Results   Component Value Date/Time    CHOLSTRLTOT 182 11/01/2022 01:52 PM    LDL 90 11/01/2022 01:52 PM    HDL 67 11/01/2022 01:52 PM    TRIGLYCERIDE 124 11/01/2022 01:52 PM       No results found for: TESTOSTERONE  Lab Results   Component Value Date/Time    TSH 1.090 10/27/2015  "09:43 AM     Lab Results   Component Value Date/Time    FREET4 1.17 10/27/2020 09:10 AM    FREET4 1.18 06/04/2020 08:56 AM     No results found for: URICACID  No components found for: VITB12  Lab Results   Component Value Date/Time    25HYDROXY 112 (H) 11/01/2022 01:52 PM    25HYDROXY 96 10/27/2021 09:18 AM     I am having Maryann Cardona maintain her Zinc, Alphagan P, dorzolamide-timolol, latanoprost, and vitamin D.  Allergies   Allergen Reactions    Ofloxacin Shortness of Breath, Rash and Swelling     Severe facial and neck swelling    Vicodin [Hydrocodone-Acetaminophen] Anxiety    Ambien [Zolpidem Tartrate]      hallucinations                HPI    Review of Systems   Constitutional: Negative.    HENT: Negative.     Eyes: Negative.    Respiratory: Negative.     Cardiovascular: Negative.    Gastrointestinal: Negative.    Genitourinary: Negative.    Musculoskeletal: Negative.    Skin: Negative.    Neurological: Negative.    Endo/Heme/Allergies: Negative.    Psychiatric/Behavioral: Negative.              Objective     /60 (BP Location: Left arm, Patient Position: Sitting, BP Cuff Size: Small adult)   Pulse (!) 56   Temp 36.3 °C (97.3 °F) (Temporal)   Resp 16   Ht 1.778 m (5' 10\")   Wt 56.2 kg (124 lb)   SpO2 98%   BMI 17.79 kg/m²      Physical Exam  Vitals reviewed.   Constitutional:       General: She is not in acute distress.     Appearance: She is well-developed. She is not diaphoretic.   HENT:      Head: Normocephalic and atraumatic.      Right Ear: External ear normal.      Left Ear: External ear normal.      Nose: Nose normal.      Mouth/Throat:      Pharynx: No oropharyngeal exudate.   Eyes:      General: No scleral icterus.        Right eye: No discharge.         Left eye: No discharge.      Conjunctiva/sclera: Conjunctivae normal.      Pupils: Pupils are equal, round, and reactive to light.   Neck:      Thyroid: No thyromegaly.      Vascular: No JVD.      Trachea: No tracheal deviation. "   Cardiovascular:      Rate and Rhythm: Normal rate and regular rhythm.      Heart sounds: Normal heart sounds. No murmur heard.    No friction rub. No gallop.   Pulmonary:      Effort: Pulmonary effort is normal. No respiratory distress.      Breath sounds: Normal breath sounds. No stridor. No wheezing or rales.   Chest:      Chest wall: No tenderness.   Abdominal:      General: Bowel sounds are normal. There is no distension.      Palpations: Abdomen is soft. There is no mass.      Tenderness: There is no abdominal tenderness. There is no guarding or rebound.   Musculoskeletal:         General: No tenderness. Normal range of motion.      Cervical back: Normal range of motion and neck supple.   Lymphadenopathy:      Cervical: No cervical adenopathy.   Skin:     General: Skin is warm and dry.      Coloration: Skin is not pale.      Findings: No erythema or rash.   Neurological:      Mental Status: She is alert and oriented to person, place, and time.      Cranial Nerves: No cranial nerve deficit.      Motor: No abnormal muscle tone.      Coordination: Coordination normal.      Deep Tendon Reflexes: Reflexes are normal and symmetric. Reflexes normal.   Psychiatric:         Behavior: Behavior normal.         Thought Content: Thought content normal.         Judgment: Judgment normal.                           Assessment & Plan        1. Hypercholesterolemia   Under good control. Continue same regimen.    - TSH; Future  - Comp Metabolic Panel; Future  - Lipid Profile; Future  - CBC WITH DIFFERENTIAL; Future    2. Primary insomnia    Under good control. Continue same regimen.    3. Vitamin D deficiency Under good control. Continue same regimen.     - VITAMIN D,25 HYDROXY (DEFICIENCY); Future    4. History of hyperparathyroidism    Under good control. Continue same regimen.    5. Non-toxic multinodular goiter    Under good control. Continue same regimen.    6. Fatigue, unspecified type   resolved    7. Vitamin B 12  deficiency    Under good control. Continue same regimen.  - VITAMIN B12; Future    8. IFG (impaired fasting glucose)      - HEMOGLOBIN A1C; Future

## 2022-11-09 ENCOUNTER — PATIENT MESSAGE (OUTPATIENT)
Dept: HEALTH INFORMATION MANAGEMENT | Facility: OTHER | Age: 77
End: 2022-11-09

## 2023-05-04 ENCOUNTER — HOSPITAL ENCOUNTER (OUTPATIENT)
Dept: RADIOLOGY | Facility: MEDICAL CENTER | Age: 78
End: 2023-05-04
Attending: INTERNAL MEDICINE
Payer: MEDICARE

## 2023-05-04 ENCOUNTER — HOSPITAL ENCOUNTER (OUTPATIENT)
Dept: LAB | Facility: MEDICAL CENTER | Age: 78
End: 2023-05-04
Attending: INTERNAL MEDICINE
Payer: MEDICARE

## 2023-05-04 ENCOUNTER — TELEPHONE (OUTPATIENT)
Dept: HEALTH INFORMATION MANAGEMENT | Facility: OTHER | Age: 78
End: 2023-05-04
Payer: MEDICARE

## 2023-05-04 DIAGNOSIS — E53.8 VITAMIN B 12 DEFICIENCY: ICD-10-CM

## 2023-05-04 DIAGNOSIS — Z12.31 VISIT FOR SCREENING MAMMOGRAM: ICD-10-CM

## 2023-05-04 DIAGNOSIS — R73.01 IFG (IMPAIRED FASTING GLUCOSE): ICD-10-CM

## 2023-05-04 DIAGNOSIS — E55.9 VITAMIN D DEFICIENCY: ICD-10-CM

## 2023-05-04 DIAGNOSIS — E78.00 HYPERCHOLESTEROLEMIA: ICD-10-CM

## 2023-05-04 LAB
25(OH)D3 SERPL-MCNC: 68 NG/ML (ref 30–100)
ALBUMIN SERPL BCP-MCNC: 4.3 G/DL (ref 3.2–4.9)
ALBUMIN/GLOB SERPL: 1.7 G/DL
ALP SERPL-CCNC: 69 U/L (ref 30–99)
ALT SERPL-CCNC: 34 U/L (ref 2–50)
ANION GAP SERPL CALC-SCNC: 10 MMOL/L (ref 7–16)
AST SERPL-CCNC: 43 U/L (ref 12–45)
BASOPHILS # BLD AUTO: 1.4 % (ref 0–1.8)
BASOPHILS # BLD: 0.07 K/UL (ref 0–0.12)
BILIRUB SERPL-MCNC: 0.5 MG/DL (ref 0.1–1.5)
BUN SERPL-MCNC: 33 MG/DL (ref 8–22)
CALCIUM ALBUM COR SERPL-MCNC: 9.4 MG/DL (ref 8.5–10.5)
CALCIUM SERPL-MCNC: 9.6 MG/DL (ref 8.5–10.5)
CHLORIDE SERPL-SCNC: 107 MMOL/L (ref 96–112)
CHOLEST SERPL-MCNC: 197 MG/DL (ref 100–199)
CO2 SERPL-SCNC: 22 MMOL/L (ref 20–33)
CREAT SERPL-MCNC: 0.73 MG/DL (ref 0.5–1.4)
EOSINOPHIL # BLD AUTO: 0.56 K/UL (ref 0–0.51)
EOSINOPHIL NFR BLD: 11.2 % (ref 0–6.9)
ERYTHROCYTE [DISTWIDTH] IN BLOOD BY AUTOMATED COUNT: 46.8 FL (ref 35.9–50)
EST. AVERAGE GLUCOSE BLD GHB EST-MCNC: 105 MG/DL
FASTING STATUS PATIENT QL REPORTED: NORMAL
GFR SERPLBLD CREATININE-BSD FMLA CKD-EPI: 83 ML/MIN/1.73 M 2
GLOBULIN SER CALC-MCNC: 2.5 G/DL (ref 1.9–3.5)
GLUCOSE SERPL-MCNC: 93 MG/DL (ref 65–99)
HBA1C MFR BLD: 5.3 % (ref 4–5.6)
HCT VFR BLD AUTO: 40.7 % (ref 37–47)
HDLC SERPL-MCNC: 87 MG/DL
HGB BLD-MCNC: 13.1 G/DL (ref 12–16)
IMM GRANULOCYTES # BLD AUTO: 0.01 K/UL (ref 0–0.11)
IMM GRANULOCYTES NFR BLD AUTO: 0.2 % (ref 0–0.9)
LDLC SERPL CALC-MCNC: 89 MG/DL
LYMPHOCYTES # BLD AUTO: 1.42 K/UL (ref 1–4.8)
LYMPHOCYTES NFR BLD: 28.5 % (ref 22–41)
MCH RBC QN AUTO: 31.5 PG (ref 27–33)
MCHC RBC AUTO-ENTMCNC: 32.2 G/DL (ref 33.6–35)
MCV RBC AUTO: 97.8 FL (ref 81.4–97.8)
MONOCYTES # BLD AUTO: 0.42 K/UL (ref 0–0.85)
MONOCYTES NFR BLD AUTO: 8.4 % (ref 0–13.4)
NEUTROPHILS # BLD AUTO: 2.5 K/UL (ref 2–7.15)
NEUTROPHILS NFR BLD: 50.3 % (ref 44–72)
NRBC # BLD AUTO: 0 K/UL
NRBC BLD-RTO: 0 /100 WBC
PLATELET # BLD AUTO: 267 K/UL (ref 164–446)
PMV BLD AUTO: 9.9 FL (ref 9–12.9)
POTASSIUM SERPL-SCNC: 3.9 MMOL/L (ref 3.6–5.5)
PROT SERPL-MCNC: 6.8 G/DL (ref 6–8.2)
RBC # BLD AUTO: 4.16 M/UL (ref 4.2–5.4)
SODIUM SERPL-SCNC: 139 MMOL/L (ref 135–145)
TRIGL SERPL-MCNC: 106 MG/DL (ref 0–149)
TSH SERPL DL<=0.005 MIU/L-ACNC: 1.23 UIU/ML (ref 0.38–5.33)
VIT B12 SERPL-MCNC: 1226 PG/ML (ref 211–911)
WBC # BLD AUTO: 5 K/UL (ref 4.8–10.8)

## 2023-05-04 PROCEDURE — 80061 LIPID PANEL: CPT

## 2023-05-04 PROCEDURE — 77063 BREAST TOMOSYNTHESIS BI: CPT

## 2023-05-04 PROCEDURE — 84443 ASSAY THYROID STIM HORMONE: CPT

## 2023-05-04 PROCEDURE — 80053 COMPREHEN METABOLIC PANEL: CPT

## 2023-05-04 PROCEDURE — 36415 COLL VENOUS BLD VENIPUNCTURE: CPT

## 2023-05-04 PROCEDURE — 82607 VITAMIN B-12: CPT

## 2023-05-04 PROCEDURE — 83036 HEMOGLOBIN GLYCOSYLATED A1C: CPT | Mod: GA

## 2023-05-04 PROCEDURE — 82306 VITAMIN D 25 HYDROXY: CPT

## 2023-05-04 PROCEDURE — 85025 COMPLETE CBC W/AUTO DIFF WBC: CPT

## 2023-05-09 ENCOUNTER — OFFICE VISIT (OUTPATIENT)
Dept: MEDICAL GROUP | Age: 78
End: 2023-05-09
Payer: MEDICARE

## 2023-05-09 VITALS
BODY MASS INDEX: 17.75 KG/M2 | HEART RATE: 60 BPM | SYSTOLIC BLOOD PRESSURE: 108 MMHG | HEIGHT: 70 IN | WEIGHT: 124 LBS | TEMPERATURE: 97.2 F | DIASTOLIC BLOOD PRESSURE: 68 MMHG | OXYGEN SATURATION: 97 %

## 2023-05-09 DIAGNOSIS — E78.00 HYPERCHOLESTEROLEMIA: ICD-10-CM

## 2023-05-09 DIAGNOSIS — F51.01 PRIMARY INSOMNIA: ICD-10-CM

## 2023-05-09 DIAGNOSIS — M81.8 OTHER OSTEOPOROSIS WITHOUT CURRENT PATHOLOGICAL FRACTURE: ICD-10-CM

## 2023-05-09 DIAGNOSIS — R73.01 IFG (IMPAIRED FASTING GLUCOSE): ICD-10-CM

## 2023-05-09 DIAGNOSIS — E55.9 VITAMIN D DEFICIENCY: ICD-10-CM

## 2023-05-09 DIAGNOSIS — E89.2 S/P PARATHYROIDECTOMY: ICD-10-CM

## 2023-05-09 PROBLEM — Z98.890 S/P PARATHYROIDECTOMY: Status: ACTIVE | Noted: 2023-05-09

## 2023-05-09 PROBLEM — Z90.89 S/P PARATHYROIDECTOMY: Status: ACTIVE | Noted: 2023-05-09

## 2023-05-09 PROCEDURE — 99214 OFFICE O/P EST MOD 30 MIN: CPT | Performed by: INTERNAL MEDICINE

## 2023-05-09 RX ORDER — VITAMIN B COMPLEX
1000 TABLET ORAL DAILY
Qty: 100 TABLET | Refills: 4 | Status: SHIPPED | OUTPATIENT
Start: 2023-05-09

## 2023-05-09 ASSESSMENT — ENCOUNTER SYMPTOMS
PSYCHIATRIC NEGATIVE: 1
MUSCULOSKELETAL NEGATIVE: 1
CARDIOVASCULAR NEGATIVE: 1
GASTROINTESTINAL NEGATIVE: 1
EYES NEGATIVE: 1
RESPIRATORY NEGATIVE: 1
CONSTITUTIONAL NEGATIVE: 1
NEUROLOGICAL NEGATIVE: 1

## 2023-05-09 ASSESSMENT — PATIENT HEALTH QUESTIONNAIRE - PHQ9: CLINICAL INTERPRETATION OF PHQ2 SCORE: 0

## 2023-05-09 ASSESSMENT — FIBROSIS 4 INDEX: FIB4 SCORE: 2.18

## 2023-05-09 NOTE — PROGRESS NOTES
"Subjective     Maryann Cardona is a 78 y.o. female who presents with Follow-Up (Lab review )    The patient is here for followup of chronic medical problems listed below. The patient is compliant with medications and having no side effects from them. Denies chest pain, abdominal pain, dyspnea, myalgias, or cough.   Patient Active Problem List   Diagnosis    Primary insomnia    Osteopenia with high risk of fracture    Primary open angle glaucoma of both eyes, indeterminate stage    History of hyperparathyroidism    Hypercholesterolemia    Fatigue    Non-toxic multinodular goiter    Vitamin D deficiency    Other osteoporosis without current pathological fracture    Vitamin B 12 deficiency    S/P parathyroidectomy (HCC)     Outpatient Medications Prior to Visit   Medication Sig Dispense Refill    ALPHAGAN P 0.1 % Solution       dorzolamide-timolol (COSOPT) 22.3-6.8 MG/ML Solution       latanoprost (XALATAN) 0.005 % Solution       Cholecalciferol (VITAMIN D) 2000 UNIT Tab Take 2 Tablets by mouth every day. (Patient not taking: Reported on 5/9/2023) 100 Tablet     Zinc 50 MG Cap Take 1 Capsule by mouth every day. (Patient not taking: Reported on 5/9/2023)       No facility-administered medications prior to visit.             HPI    Review of Systems   Constitutional: Negative.    HENT: Negative.     Eyes: Negative.    Respiratory: Negative.     Cardiovascular: Negative.    Gastrointestinal: Negative.    Genitourinary: Negative.    Musculoskeletal: Negative.    Skin: Negative.    Neurological: Negative.    Endo/Heme/Allergies: Negative.    Psychiatric/Behavioral: Negative.              Objective     /68 (BP Location: Left arm, Patient Position: Sitting, BP Cuff Size: Adult)   Pulse 60   Temp 36.2 °C (97.2 °F) (Temporal)   Ht 1.778 m (5' 10\")   Wt 56.2 kg (124 lb)   SpO2 97%   BMI 17.79 kg/m²      Physical Exam  Vitals reviewed.   Constitutional:       General: She is not in acute distress.     Appearance: " She is well-developed. She is not diaphoretic.   HENT:      Head: Normocephalic and atraumatic.      Right Ear: External ear normal.      Left Ear: External ear normal.      Nose: Nose normal.      Mouth/Throat:      Pharynx: No oropharyngeal exudate.   Eyes:      General: No scleral icterus.        Right eye: No discharge.         Left eye: No discharge.      Conjunctiva/sclera: Conjunctivae normal.      Pupils: Pupils are equal, round, and reactive to light.   Neck:      Thyroid: No thyromegaly.      Vascular: No JVD.      Trachea: No tracheal deviation.   Cardiovascular:      Rate and Rhythm: Normal rate and regular rhythm.      Heart sounds: Normal heart sounds. No murmur heard.    No friction rub. No gallop.   Pulmonary:      Effort: Pulmonary effort is normal. No respiratory distress.      Breath sounds: Normal breath sounds. No stridor. No wheezing or rales.   Chest:      Chest wall: No tenderness.   Abdominal:      General: Bowel sounds are normal. There is no distension.      Palpations: Abdomen is soft. There is no mass.      Tenderness: There is no abdominal tenderness. There is no guarding or rebound.   Musculoskeletal:         General: No tenderness. Normal range of motion.      Cervical back: Normal range of motion and neck supple.   Lymphadenopathy:      Cervical: No cervical adenopathy.   Skin:     General: Skin is warm and dry.      Coloration: Skin is not pale.      Findings: No erythema or rash.   Neurological:      Mental Status: She is alert and oriented to person, place, and time.      Cranial Nerves: No cranial nerve deficit.      Motor: No abnormal muscle tone.      Coordination: Coordination normal.      Deep Tendon Reflexes: Reflexes are normal and symmetric. Reflexes normal.   Psychiatric:         Behavior: Behavior normal.         Thought Content: Thought content normal.         Judgment: Judgment normal.     Hospital Outpatient Visit on 05/04/2023   Component Date Value    Vitamin B12  -True Cobala* 05/04/2023 1226 (H)     25-Hydroxy   Vitamin D 25 05/04/2023 68     Glycohemoglobin 05/04/2023 5.3     Est Avg Glucose 05/04/2023 105     WBC 05/04/2023 5.0     RBC 05/04/2023 4.16 (L)     Hemoglobin 05/04/2023 13.1     Hematocrit 05/04/2023 40.7     MCV 05/04/2023 97.8     MCH 05/04/2023 31.5     MCHC 05/04/2023 32.2 (L)     RDW 05/04/2023 46.8     Platelet Count 05/04/2023 267     MPV 05/04/2023 9.9     Neutrophils-Polys 05/04/2023 50.30     Lymphocytes 05/04/2023 28.50     Monocytes 05/04/2023 8.40     Eosinophils 05/04/2023 11.20 (H)     Basophils 05/04/2023 1.40     Immature Granulocytes 05/04/2023 0.20     Nucleated RBC 05/04/2023 0.00     Neutrophils (Absolute) 05/04/2023 2.50     Lymphs (Absolute) 05/04/2023 1.42     Monos (Absolute) 05/04/2023 0.42     Eos (Absolute) 05/04/2023 0.56 (H)     Baso (Absolute) 05/04/2023 0.07     Immature Granulocytes (a* 05/04/2023 0.01     NRBC (Absolute) 05/04/2023 0.00     Cholesterol,Tot 05/04/2023 197     Triglycerides 05/04/2023 106     HDL 05/04/2023 87     LDL 05/04/2023 89     Sodium 05/04/2023 139     Potassium 05/04/2023 3.9     Chloride 05/04/2023 107     Co2 05/04/2023 22     Anion Gap 05/04/2023 10.0     Glucose 05/04/2023 93     Bun 05/04/2023 33 (H)     Creatinine 05/04/2023 0.73     Calcium 05/04/2023 9.6     AST(SGOT) 05/04/2023 43     ALT(SGPT) 05/04/2023 34     Alkaline Phosphatase 05/04/2023 69     Total Bilirubin 05/04/2023 0.5     Albumin 05/04/2023 4.3     Total Protein 05/04/2023 6.8     Globulin 05/04/2023 2.5     A-G Ratio 05/04/2023 1.7     TSH 05/04/2023 1.230     Fasting Status 05/04/2023 Fasting     Correct Calcium 05/04/2023 9.4     GFR (CKD-EPI) 05/04/2023 83       .alll  Lab Results   Component Value Date/Time    HBA1C 5.3 05/04/2023 10:17 AM    HBA1C 5.4 11/01/2022 01:52 PM     Lab Results   Component Value Date/Time    SODIUM 139 05/04/2023 10:17 AM    POTASSIUM 3.9 05/04/2023 10:17 AM    CHLORIDE 107 05/04/2023 10:17 AM    CO2  22 05/04/2023 10:17 AM    GLUCOSE 93 05/04/2023 10:17 AM    BUN 33 (H) 05/04/2023 10:17 AM    CREATININE 0.73 05/04/2023 10:17 AM    CREATININE 0.7 09/04/2008 11:10 PM    BUNCREATRAT 21 10/27/2015 09:43 AM    ALKPHOSPHAT 69 05/04/2023 10:17 AM    ASTSGOT 43 05/04/2023 10:17 AM    ALTSGPT 34 05/04/2023 10:17 AM    TBILIRUBIN 0.5 05/04/2023 10:17 AM     Lab Results   Component Value Date/Time    INR 0.90 03/27/2016 07:43 PM    INR 1.14 05/09/2011 04:25 AM    INR 1.12 05/07/2011 04:00 AM     Lab Results   Component Value Date/Time    CHOLSTRLTOT 197 05/04/2023 10:17 AM    LDL 89 05/04/2023 10:17 AM    HDL 87 05/04/2023 10:17 AM    TRIGLYCERIDE 106 05/04/2023 10:17 AM       No results found for: TESTOSTERONE  Lab Results   Component Value Date/Time    TSH 1.090 10/27/2015 09:43 AM     Lab Results   Component Value Date/Time    FREET4 1.17 10/27/2020 09:10 AM    FREET4 1.18 06/04/2020 08:56 AM     No results found for: URICACID  No components found for: VITB12  Lab Results   Component Value Date/Time    25HYDROXY 68 05/04/2023 10:17 AM    25HYDROXY 112 (H) 11/01/2022 01:52 PM                            Assessment & Plan        1. Vitamin D deficiency   Under good control. Continue same regimen.'  - vitamin D3 (CHOLECALCIFEROL) 1000 Unit (25 mcg) Tab; Take 1 Tablet by mouth every day.  Dispense: 100 Tablet; Refill: 4  - VITAMIN D,25 HYDROXY (DEFICIENCY); Future    2. Primary insomnia           Under good control. Continue same regimen.'   herbal tea at bedtime    3. Other osteoporosis without current pathological fracture      Under good control. Continue same regimen.'    4. S/P parathyroidectomy (HCC)        Under good control. Continue same regimen.'  5. Hypercholesterolemia      Under good control. Continue same regimen.'  Mediterranean diet and exercise.  No meds  - Lipid Profile; Future  - TSH; Future  - CBC WITH DIFFERENTIAL; Future  - Comp Metabolic Panel; Future    6. IFG (impaired fasting glucose)      Under  good control. Continue same regimen.'  Mediterranean diet and exercise.  No meds  - HEMOGLOBIN A1C; Future

## 2023-10-31 ENCOUNTER — HOSPITAL ENCOUNTER (OUTPATIENT)
Dept: LAB | Facility: MEDICAL CENTER | Age: 78
End: 2023-10-31
Attending: INTERNAL MEDICINE
Payer: MEDICARE

## 2023-10-31 DIAGNOSIS — R73.01 IFG (IMPAIRED FASTING GLUCOSE): ICD-10-CM

## 2023-10-31 DIAGNOSIS — E55.9 VITAMIN D DEFICIENCY: ICD-10-CM

## 2023-10-31 DIAGNOSIS — E78.00 HYPERCHOLESTEROLEMIA: ICD-10-CM

## 2023-10-31 LAB
25(OH)D3 SERPL-MCNC: 89 NG/ML (ref 30–100)
ALBUMIN SERPL BCP-MCNC: 4.2 G/DL (ref 3.2–4.9)
ALBUMIN/GLOB SERPL: 1.7 G/DL
ALP SERPL-CCNC: 73 U/L (ref 30–99)
ALT SERPL-CCNC: 15 U/L (ref 2–50)
ANION GAP SERPL CALC-SCNC: 9 MMOL/L (ref 7–16)
AST SERPL-CCNC: 24 U/L (ref 12–45)
BASOPHILS # BLD AUTO: 1.3 % (ref 0–1.8)
BASOPHILS # BLD: 0.08 K/UL (ref 0–0.12)
BILIRUB SERPL-MCNC: 0.4 MG/DL (ref 0.1–1.5)
BUN SERPL-MCNC: 22 MG/DL (ref 8–22)
CALCIUM ALBUM COR SERPL-MCNC: 9.7 MG/DL (ref 8.5–10.5)
CALCIUM SERPL-MCNC: 9.9 MG/DL (ref 8.5–10.5)
CHLORIDE SERPL-SCNC: 102 MMOL/L (ref 96–112)
CHOLEST SERPL-MCNC: 200 MG/DL (ref 100–199)
CO2 SERPL-SCNC: 24 MMOL/L (ref 20–33)
CREAT SERPL-MCNC: 0.9 MG/DL (ref 0.5–1.4)
EOSINOPHIL # BLD AUTO: 0.46 K/UL (ref 0–0.51)
EOSINOPHIL NFR BLD: 7.3 % (ref 0–6.9)
ERYTHROCYTE [DISTWIDTH] IN BLOOD BY AUTOMATED COUNT: 46.6 FL (ref 35.9–50)
EST. AVERAGE GLUCOSE BLD GHB EST-MCNC: 108 MG/DL
GFR SERPLBLD CREATININE-BSD FMLA CKD-EPI: 65 ML/MIN/1.73 M 2
GLOBULIN SER CALC-MCNC: 2.5 G/DL (ref 1.9–3.5)
GLUCOSE SERPL-MCNC: 107 MG/DL (ref 65–99)
HBA1C MFR BLD: 5.4 % (ref 4–5.6)
HCT VFR BLD AUTO: 43.2 % (ref 37–47)
HDLC SERPL-MCNC: 73 MG/DL
HGB BLD-MCNC: 14.1 G/DL (ref 12–16)
IMM GRANULOCYTES # BLD AUTO: 0.01 K/UL (ref 0–0.11)
IMM GRANULOCYTES NFR BLD AUTO: 0.2 % (ref 0–0.9)
LDLC SERPL CALC-MCNC: 101 MG/DL
LYMPHOCYTES # BLD AUTO: 1.85 K/UL (ref 1–4.8)
LYMPHOCYTES NFR BLD: 29.2 % (ref 22–41)
MCH RBC QN AUTO: 32.3 PG (ref 27–33)
MCHC RBC AUTO-ENTMCNC: 32.6 G/DL (ref 32.2–35.5)
MCV RBC AUTO: 98.9 FL (ref 81.4–97.8)
MONOCYTES # BLD AUTO: 0.63 K/UL (ref 0–0.85)
MONOCYTES NFR BLD AUTO: 10 % (ref 0–13.4)
NEUTROPHILS # BLD AUTO: 3.3 K/UL (ref 1.82–7.42)
NEUTROPHILS NFR BLD: 52 % (ref 44–72)
NRBC # BLD AUTO: 0 K/UL
NRBC BLD-RTO: 0 /100 WBC (ref 0–0.2)
PLATELET # BLD AUTO: 290 K/UL (ref 164–446)
PMV BLD AUTO: 9.9 FL (ref 9–12.9)
POTASSIUM SERPL-SCNC: 4.2 MMOL/L (ref 3.6–5.5)
PROT SERPL-MCNC: 6.7 G/DL (ref 6–8.2)
RBC # BLD AUTO: 4.37 M/UL (ref 4.2–5.4)
SODIUM SERPL-SCNC: 135 MMOL/L (ref 135–145)
TRIGL SERPL-MCNC: 129 MG/DL (ref 0–149)
TSH SERPL DL<=0.005 MIU/L-ACNC: 1.66 UIU/ML (ref 0.38–5.33)
WBC # BLD AUTO: 6.3 K/UL (ref 4.8–10.8)

## 2023-10-31 PROCEDURE — 80061 LIPID PANEL: CPT

## 2023-10-31 PROCEDURE — 82306 VITAMIN D 25 HYDROXY: CPT

## 2023-10-31 PROCEDURE — 85025 COMPLETE CBC W/AUTO DIFF WBC: CPT

## 2023-10-31 PROCEDURE — 36415 COLL VENOUS BLD VENIPUNCTURE: CPT | Mod: GA

## 2023-10-31 PROCEDURE — 83036 HEMOGLOBIN GLYCOSYLATED A1C: CPT | Mod: GA

## 2023-10-31 PROCEDURE — 80053 COMPREHEN METABOLIC PANEL: CPT

## 2023-10-31 PROCEDURE — 84443 ASSAY THYROID STIM HORMONE: CPT

## 2023-11-07 ENCOUNTER — OFFICE VISIT (OUTPATIENT)
Dept: MEDICAL GROUP | Age: 78
End: 2023-11-07
Payer: MEDICARE

## 2023-11-07 VITALS
SYSTOLIC BLOOD PRESSURE: 112 MMHG | WEIGHT: 125 LBS | BODY MASS INDEX: 17.9 KG/M2 | OXYGEN SATURATION: 98 % | HEIGHT: 70 IN | TEMPERATURE: 97.8 F | DIASTOLIC BLOOD PRESSURE: 52 MMHG | HEART RATE: 58 BPM

## 2023-11-07 DIAGNOSIS — F51.01 PRIMARY INSOMNIA: ICD-10-CM

## 2023-11-07 DIAGNOSIS — Z00.00 MEDICARE ANNUAL WELLNESS VISIT, SUBSEQUENT: ICD-10-CM

## 2023-11-07 DIAGNOSIS — E78.5 DYSLIPIDEMIA: ICD-10-CM

## 2023-11-07 DIAGNOSIS — Z23 NEED FOR VACCINATION: ICD-10-CM

## 2023-11-07 DIAGNOSIS — R73.01 IFG (IMPAIRED FASTING GLUCOSE): ICD-10-CM

## 2023-11-07 DIAGNOSIS — E55.9 VITAMIN D DEFICIENCY: ICD-10-CM

## 2023-11-07 PROCEDURE — 3078F DIAST BP <80 MM HG: CPT | Performed by: INTERNAL MEDICINE

## 2023-11-07 PROCEDURE — G0439 PPPS, SUBSEQ VISIT: HCPCS | Mod: 25 | Performed by: INTERNAL MEDICINE

## 2023-11-07 PROCEDURE — G0008 ADMIN INFLUENZA VIRUS VAC: HCPCS | Performed by: INTERNAL MEDICINE

## 2023-11-07 PROCEDURE — 90662 IIV NO PRSV INCREASED AG IM: CPT | Performed by: INTERNAL MEDICINE

## 2023-11-07 PROCEDURE — 3074F SYST BP LT 130 MM HG: CPT | Performed by: INTERNAL MEDICINE

## 2023-11-07 RX ORDER — ZOLPIDEM TARTRATE 5 MG/1
5 TABLET ORAL NIGHTLY PRN
Qty: 30 TABLET | Refills: 5 | Status: SHIPPED | OUTPATIENT
Start: 2023-11-07 | End: 2023-12-07

## 2023-11-07 ASSESSMENT — PATIENT HEALTH QUESTIONNAIRE - PHQ9: CLINICAL INTERPRETATION OF PHQ2 SCORE: 0

## 2023-11-07 ASSESSMENT — FIBROSIS 4 INDEX: FIB4 SCORE: 1.69

## 2023-11-07 ASSESSMENT — ACTIVITIES OF DAILY LIVING (ADL): BATHING_REQUIRES_ASSISTANCE: 0

## 2023-11-07 ASSESSMENT — ENCOUNTER SYMPTOMS: GENERAL WELL-BEING: EXCELLENT

## 2023-11-07 NOTE — PROGRESS NOTES
No chief complaint on file.      HPI:  Maryann Cardona is a 78 y.o. here for Medicare Annual Wellness Visit     Patient Active Problem List    Diagnosis Date Noted    S/P parathyroidectomy (HCC) 05/09/2023    Vitamin B 12 deficiency 11/07/2022    Other osteoporosis without current pathological fracture 11/04/2021    Fatigue 07/06/2020    Non-toxic multinodular goiter 07/06/2020    Vitamin D deficiency 07/06/2020    Hypercholesterolemia 04/13/2017    History of hyperparathyroidism 12/13/2016    Osteopenia with high risk of fracture 11/03/2015    Primary open angle glaucoma of both eyes, indeterminate stage 11/03/2015    Primary insomnia 08/27/2015       Current Outpatient Medications   Medication Sig Dispense Refill    vitamin D3 (CHOLECALCIFEROL) 1000 Unit (25 mcg) Tab Take 1 Tablet by mouth every day. 100 Tablet 4    ALPHAGAN P 0.1 % Solution       dorzolamide-timolol (COSOPT) 22.3-6.8 MG/ML Solution       latanoprost (XALATAN) 0.005 % Solution        No current facility-administered medications for this visit.          Current supplements as per medication list.     Allergies: Ofloxacin, Vicodin [hydrocodone-acetaminophen], and Ambien [zolpidem tartrate]    Current social contact/activities: Travel with friends, camp with family, exercise.      She  reports that she has never smoked. She has never used smokeless tobacco. She reports current alcohol use. She reports that she does not currently use drugs after having used the following drugs: Marijuana.  Counseling given: Not Answered      ROS:    Gait: Uses no assistive device  Ostomy: No  Other tubes: No  Amputations: No  Chronic oxygen use: No  Last eye exam: 10/2023  Wears hearing aids: No   : Reports urinary leakage during the last 6 months that has not interfered at all with their daily activities or sleep.    Screening:    Depression Screening  Little interest or pleasure in doing things?  0 - not at all  Feeling down, depressed , or hopeless? 0 - not at  all  Patient Health Questionnaire Score: 0     If depressive symptoms identified deferred to follow up visit unless specifically addressed in assessment and plan.    Interpretation of PHQ-9 Total Score   Score Severity   1-4 No Depression   5-9 Mild Depression   10-14 Moderate Depression   15-19 Moderately Severe Depression   20-27 Severe Depression    Screening for Cognitive Impairment  Do you or any of your friends or family members have any concern about your memory? No  Three Minute Recall (Banana, Sunrise, Chair) 1/3    Jacob clock face with all 12 numbers and set the hands to show 20 past 8.  Yes    Cognitive concerns identified deferred for follow up unless specifically addressed in assessment and plan.    Fall Risk Assessment  Has the patient had two or more falls in the last year or any fall with injury in the last year?  No    Safety Assessment  Do you always wear your seatbelt?  Yes  Any changes to home needed to function safely? No  Difficulty hearing.  No  Patient counseled about all safety risks that were identified.    Functional Assessment ADLs  Are there any barriers preventing you from cooking for yourself or meeting nutritional needs?  No.    Are there any barriers preventing you from driving safely or obtaining transportation?  No.    Are there any barriers preventing you from using a telephone or calling for help?  No    Are there any barriers preventing you from shopping?  No.    Are there any barriers preventing you from taking care of your own finances?  No    Are there any barriers preventing you from managing your medications?  No    Are there any barriers preventing you from showering, bathing or dressing yourself? No    Are there any barriers preventing you from doing housework or laundry? No  Are there any barriers preventing you from using the toilet?No  Are you currently engaging in any exercise or physical activity?  Yes.      Self-Assessment of Health  What is your perception of your  health? Excellent  Do you sleep more than six hours a night? No  In the past 7 days, how much did pain keep you from doing your normal work? None  Do you spend quality time with family or friends (virtually or in person)? Yes  Do you usually eat a heart healthy diet that constists of a variety of fruits, vegetables, whole grains and fiber? Yes  Do you eat foods high in fat and/or Fast Food more than three times per week? No    Advance Care Planning  Do you have an Advance Directive, Living Will, Durable Power of , or POLST? Yes  Advance Directive Living Will Durable Power of  POLST is on file      Health Maintenance Summary            Overdue - Annual Wellness Visit (Every 366 Days) Overdue since 11/5/2021 11/04/2020  Visit Dx: Medicare annual wellness visit, subsequent    04/04/2018  Visit Dx: Medicare annual wellness visit, subsequent    12/13/2016  Visit Dx: Medicare annual wellness visit, subsequent              Overdue - COVID-19 Vaccine (6 - Pfizer series) Overdue since 3/20/2023      11/20/2022  Outside Immunization: COVID Bivalent (PFR 12+)    05/29/2022  Imm Admin: PFIZER MCKEON CAP SARS-COV-2 VACCINATION (12+)    10/14/2021  Imm Admin: PFIZER PURPLE CAP SARS-COV-2 VACCINATION (12+)    02/05/2021  Imm Admin: PFIZER PURPLE CAP SARS-COV-2 VACCINATION (12+)    01/14/2021  Imm Admin: PFIZER PURPLE CAP SARS-COV-2 VACCINATION (12+)              Overdue - Influenza Vaccine (1) Order placed this encounter      11/20/2022  Outside Immunization: Fluzone High-Dose Quad    10/14/2021  Imm Admin: Influenza, Unspecified - HISTORICAL DATA    09/20/2020  Imm Admin: Influenza, Unspecified - HISTORICAL DATA    11/01/2019  Imm Admin: Influenza Vaccine Adult HD              Bone Density Scan (Every 2 Years) Next due on 3/2/2024      03/02/2022  DS-BONE DENSITY STUDY (DEXA)    02/28/2020  DS-BONE DENSITY STUDY (DEXA)    02/16/2018  DS-BONE DENSITY STUDY (DEXA)    01/28/2016  DS-BONE DENSITY STUDY (DEXA)     01/27/2015  DS-BONE DENSITY STUDY (DEXA)    Only the first 5 history entries have been loaded, but more history exists.              IMM DTaP/Tdap/Td Vaccine (2 - Td or Tdap) Next due on 3/27/2026      03/27/2016  Imm Admin: Tdap Vaccine              Hepatitis C Screening  Completed      05/06/2011  Hepatitis C Antibody component of HEP C VIRUS ANTIBODY              Zoster (Shingles) Vaccines (Series Information) Completed      01/03/2020  Imm Admin: Zoster Vaccine Recombinant (RZV) (SHINGRIX)    11/01/2019  Imm Admin: Zoster Vaccine Recombinant (RZV) (SHINGRIX)    09/16/2013  Imm Admin: Zoster Vaccine Live (ZVL) (Zostavax) - HISTORICAL DATA              Pneumococcal Vaccine: 65+ Years (Series Information) Completed      01/20/2020  Imm Admin: Pneumococcal polysaccharide vaccine (PPSV-23)    12/13/2016  Imm Admin: Pneumococcal Conjugate Vaccine (Prevnar/PCV-13)    09/24/2011  Imm Admin: Pneumococcal Vaccine (UF) - HISTORICAL DATA    09/24/2011  Imm Admin: Pneumococcal polysaccharide vaccine (PPSV-23)              Hepatitis A Vaccine (Hep A) (Series Information) Aged Out      No completion history exists for this topic.              Hepatitis B Vaccine (Hep B) (Series Information) Aged Out      No completion history exists for this topic.              HPV Vaccines (Series Information) Aged Out      No completion history exists for this topic.              Polio Vaccine (Inactivated Polio) (Series Information) Aged Out      No completion history exists for this topic.              Meningococcal Immunization (Series Information) Aged Out      No completion history exists for this topic.              Discontinued - Mammogram  Discontinued        Frequency changed to Never automatically (Topic No Longer Applies)    05/04/2023  MA-SCREENING MAMMO BILAT W/TOMOSYNTHESIS W/CAD    04/06/2022  MA-SCREENING MAMMO BILAT W/TOMOSYNTHESIS W/CAD    04/01/2021  MA-SCREENING MAMMO BILAT W/TOMOSYNTHESIS W/CAD    02/28/2020   MA-SCREENING MAMMO BILAT W/TOMOSYNTHESIS W/CAD    Only the first 5 history entries have been loaded, but more history exists.              Discontinued - Colorectal Cancer Screening  Discontinued        Frequency changed to Never automatically (Topic No Longer Applies)    11/03/2015  Colonoscopy (Postponed)                    Patient Care Team:  Hardeep Kirkpatrick M.D. as PCP - General (Internal Medicine)  Jeff Ariza M.D. as Consulting Physician (Dermatology)  Mainor Bishop M.D. as Consulting Physician (OB & Gyn - Gynecology)  Kelsey Souza M.D. as Consulting Physician (Endocrinology)  Hardeep Rios M.D. as Consulting Physician (Ophthalmology)        Social History     Tobacco Use    Smoking status: Never    Smokeless tobacco: Never   Vaping Use    Vaping Use: Never used   Substance Use Topics    Alcohol use: Yes     Alcohol/week: 0.0 oz     Comment: occ    Drug use: Not Currently     Types: Marijuana     Comment: tried it for sleep     Family History   Problem Relation Age of Onset    Stroke Mother     Lung Disease Mother         emphysema    Lung Disease Father     Cancer Father     Osteoporosis Brother     Diabetes Other     Hypertension Other     Stroke Other     Cancer Other     Cancer Maternal Aunt         breast cancer    No Known Problems Maternal Grandmother     Diabetes Maternal Grandfather     No Known Problems Paternal Grandmother     No Known Problems Paternal Grandfather      She  has a past medical history of Anesthesia, Arrhythmia, CATARACT, Glaucoma, Macrocytic anemia (3/14/2019), Other specified disorder of intestines, and Pain.   Past Surgical History:   Procedure Laterality Date    PARATHYROID EXPLORATION  2/1/2012    Performed by RICKY CASEY at SURGERY SAME DAY [facility] ORS    LUMBAR LAMINECTOMY DISKECTOMY  9/23/2011    Performed by JADEN WALL at SURGERY [facility]    LUMBAR DECOMPRESSION  9/23/2011    Performed by JADEN WALL at SURGERY [facility] ORS    KNEE  ARTHROSCOPY  10/21/2009    Performed by TRACI PADRON at SURGERY [facility] ORS    MENISCECTOMY, KNEE, MEDIAL  10/21/2009    Performed by TRACI PADRON at SURGERY [facility] ORS    ABDOMINOPLASTY  2003    HYSTERECTOMY, VAGINAL  1999    ROTATOR CUFF REPAIR  1999    APPENDECTOMY  1967    US-NEEDLE CORE BX-BREAST PANEL  2001?    right breast, benign       Exam:   There were no vitals taken for this visit. There is no height or weight on file to calculate BMI.    Hearing good.    Dentition good  Alert, oriented in no acute distress.  Eye contact is good, speech goal directed, affect calm    Assessment and Plan. The follo\wing treatment and monitoring plan is recommended:    1. Medicare annual wellness visit, subsequent  Exam unremarkable.  All metrics reviewed and updated.  Vaccination status updated.    2. Need for vaccination     - INFLUENZA VACCINE, HIGH DOSE (65+ ONLY)    3. Primary insomnia recurrent problem  Not well controlled.  Resume Ambien 5 mg daily.  This worked for her in the past.  - zolpidem (AMBIEN) 5 MG Tab; Take 1 Tablet by mouth at bedtime as needed for Sleep for up to 30 days.  Dispense: 30 Tablet; Refill: 5    4. Dyslipidemia  Good control continue current regimen with Mediterranean diet and exercise.  No meds  - TSH; Future  - CBC WITH DIFFERENTIAL; Future  - Comp Metabolic Panel; Future  - Lipid Profile; Future    5. IFG (impaired fasting glucose)  Under good control-continue Mediterranean diet and exercise  - HEMOGLOBIN A1C; Future    6. Vitamin D deficiency  Good control continue vitamin D3 2000 units daily  - VITAMIN D,25 HYDROXY (DEFICIENCY); Future      Services suggested: No services needed at this time  Health Care Screening: Age-appropriate preventive services recommended by USPTF and ACIP covered by Medicare were discussed today. Services ordered if indicated and agreed upon by the patient.  Referrals offered: Community-based lifestyle interventions to reduce health risks and promote  self-management and wellness, fall prevention, nutrition, physical activity, tobacco-use cessation, weight loss, and mental health services as per orders if indicated.    Discussion today about general wellness and lifestyle habits:    Prevent falls and reduce trip hazards; Cautioned about securing or removing rugs.  Have a working fire alarm and carbon monoxide detector;   Engage in regular physical activity and social activities     Follow-up: No follow-ups on file.

## 2023-11-08 ENCOUNTER — TELEPHONE (OUTPATIENT)
Dept: MEDICAL GROUP | Age: 78
End: 2023-11-08
Payer: MEDICARE

## 2023-11-08 NOTE — TELEPHONE ENCOUNTER
Patient contacted office in required to a coupon offer she had received in her email for her RX. Informed if there was a coupon code it would be done at the pharmacy. Informed our office does not mail out coupons. Provided number to [employer] service to inquire.

## 2023-11-29 ENCOUNTER — PATIENT MESSAGE (OUTPATIENT)
Dept: HEALTH INFORMATION MANAGEMENT | Facility: OTHER | Age: 78
End: 2023-11-29

## 2024-02-26 DIAGNOSIS — M81.8 OTHER OSTEOPOROSIS WITHOUT CURRENT PATHOLOGICAL FRACTURE: ICD-10-CM

## 2024-03-21 ENCOUNTER — HOSPITAL ENCOUNTER (OUTPATIENT)
Dept: RADIOLOGY | Facility: MEDICAL CENTER | Age: 79
End: 2024-03-21
Attending: INTERNAL MEDICINE
Payer: MEDICARE

## 2024-03-21 DIAGNOSIS — M81.8 OTHER OSTEOPOROSIS WITHOUT CURRENT PATHOLOGICAL FRACTURE: ICD-10-CM

## 2024-03-21 PROCEDURE — 77080 DXA BONE DENSITY AXIAL: CPT

## 2024-05-06 ENCOUNTER — HOSPITAL ENCOUNTER (OUTPATIENT)
Dept: RADIOLOGY | Facility: MEDICAL CENTER | Age: 79
End: 2024-05-06
Attending: INTERNAL MEDICINE
Payer: MEDICARE

## 2024-05-06 DIAGNOSIS — Z12.31 SCREENING MAMMOGRAM FOR BREAST CANCER: ICD-10-CM

## 2024-07-17 ENCOUNTER — HOSPITAL ENCOUNTER (OUTPATIENT)
Dept: LAB | Facility: MEDICAL CENTER | Age: 79
End: 2024-07-17
Attending: INTERNAL MEDICINE
Payer: MEDICARE

## 2024-07-17 LAB
T4 FREE SERPL-MCNC: 1.06 NG/DL (ref 0.93–1.7)
TSH SERPL-ACNC: 0.62 UIU/ML (ref 0.35–5.5)

## 2024-07-17 PROCEDURE — 36415 COLL VENOUS BLD VENIPUNCTURE: CPT

## 2024-07-17 PROCEDURE — 84443 ASSAY THYROID STIM HORMONE: CPT

## 2024-07-17 PROCEDURE — 84439 ASSAY OF FREE THYROXINE: CPT

## 2024-09-28 ENCOUNTER — OFFICE VISIT (OUTPATIENT)
Dept: URGENT CARE | Facility: CLINIC | Age: 79
End: 2024-09-28
Payer: MEDICARE

## 2024-09-28 VITALS
BODY MASS INDEX: 18.32 KG/M2 | HEIGHT: 70 IN | OXYGEN SATURATION: 95 % | DIASTOLIC BLOOD PRESSURE: 66 MMHG | TEMPERATURE: 98.6 F | WEIGHT: 128 LBS | SYSTOLIC BLOOD PRESSURE: 130 MMHG | HEART RATE: 84 BPM | RESPIRATION RATE: 16 BRPM

## 2024-09-28 DIAGNOSIS — S51.811A SKIN TEAR OF RIGHT FOREARM WITHOUT COMPLICATION, INITIAL ENCOUNTER: ICD-10-CM

## 2024-09-28 ASSESSMENT — FIBROSIS 4 INDEX: FIB4 SCORE: 1.71

## 2024-09-28 NOTE — PROCEDURES
Laceration Repair    Date/Time: 9/28/2024 4:00 PM    Performed by: RAQUEL Christianson  Authorized by: RAQUEL Christianson  Body area: upper extremity  Location details: right lower arm  Wound length (cm): Skin tear.  Tendon involvement: none  Nerve involvement: none  Vascular damage: no    Sedation:  Patient sedated: no    Irrigation solution: saline  Irrigation method: tap and syringe  Amount of cleaning: standard  Debridement: none  Degree of undermining: none  Wound skin closure material used: Dermabond.  Technique: simple  Approximation: close  Approximation difficulty: simple  Patient tolerance: patient tolerated the procedure well with no immediate complications

## 2024-09-28 NOTE — PROGRESS NOTES
Chief Complaint   Patient presents with    Laceration     Right arm skin tear       HISTORY OF PRESENT ILLNESS: Patient is a pleasant 79 y.o. female who presents to urgent care today patient was using camping equipment when she had a laceration to her right forearm.  Concerned she may need stitches, she is currently up-to-date on her tetanus.    Patient Active Problem List    Diagnosis Date Noted    S/P parathyroidectomy 05/09/2023    Vitamin B 12 deficiency 11/07/2022    Other osteoporosis without current pathological fracture 11/04/2021    Fatigue 07/06/2020    Non-toxic multinodular goiter 07/06/2020    Vitamin D deficiency 07/06/2020    Hypercholesterolemia 04/13/2017    History of hyperparathyroidism 12/13/2016    Osteopenia with high risk of fracture 11/03/2015    Primary open angle glaucoma of both eyes, indeterminate stage 11/03/2015    Primary insomnia 08/27/2015       Allergies:Ofloxacin, Vicodin [hydrocodone-acetaminophen], and Ambien [zolpidem tartrate]    Current Outpatient Medications Ordered in Epic   Medication Sig Dispense Refill    vitamin D3 (CHOLECALCIFEROL) 1000 Unit (25 mcg) Tab Take 1 Tablet by mouth every day. 100 Tablet 4    ALPHAGAN P 0.1 % Solution       dorzolamide-timolol (COSOPT) 22.3-6.8 MG/ML Solution       latanoprost (XALATAN) 0.005 % Solution        No current Epic-ordered facility-administered medications on file.       Past Medical History:   Diagnosis Date    Anesthesia     positioning of c spine, sp epidurals    Arrhythmia     Left BBB, asymptomatic    CATARACT     Glaucoma     Macrocytic anemia 3/14/2019    Other specified disorder of intestines     constipation    Pain        Social History     Tobacco Use    Smoking status: Never    Smokeless tobacco: Never   Vaping Use    Vaping status: Never Used   Substance Use Topics    Alcohol use: Yes     Alcohol/week: 0.0 oz     Comment: occ    Drug use: Not Currently     Types: Marijuana     Comment: tried it for sleep       Family  "Status   Relation Name Status    Mo 83     Fa 84     Bro 73 Alive    Hannah 52 Alive    Hannah 50 Alive    Hannah 49 Alive    OTHER  (Not Specified)    MAunt  (Not Specified)    MGMo      MGFa      PGMo      PGFa     No partnership data on file     Family History   Problem Relation Age of Onset    Stroke Mother     Lung Disease Mother         emphysema    Lung Disease Father     Cancer Father     Osteoporosis Brother     Diabetes Other     Hypertension Other     Stroke Other     Cancer Other     Cancer Maternal Aunt         breast cancer    No Known Problems Maternal Grandmother     Diabetes Maternal Grandfather     No Known Problems Paternal Grandmother     No Known Problems Paternal Grandfather        Review of Systems   Skin:         Laceration to the right forearm       Exam:  /66 (BP Location: Left arm, Patient Position: Sitting, BP Cuff Size: Adult)   Pulse 84   Temp 37 °C (98.6 °F) (Temporal)   Resp 16   Ht 1.778 m (5' 10\")   Wt 58.1 kg (128 lb)   SpO2 95%   Physical Exam  Vitals reviewed.   Constitutional:       General: She is not in acute distress.     Appearance: Normal appearance. She is normal weight.   HENT:      Head: Normocephalic.      Nose: Nose normal.      Mouth/Throat:      Mouth: Mucous membranes are moist.      Pharynx: Oropharynx is clear.   Eyes:      Extraocular Movements: Extraocular movements intact.      Pupils: Pupils are equal, round, and reactive to light.   Cardiovascular:      Rate and Rhythm: Normal rate and regular rhythm.      Pulses: Normal pulses.   Pulmonary:      Effort: Pulmonary effort is normal.   Musculoskeletal:         General: Normal range of motion.      Cervical back: Normal range of motion.   Skin:     General: Skin is warm and dry.      Findings: Erythema present.      Comments: Skin tear noted to the right forearm   Neurological:      General: No focal deficit present.      Mental Status: She is alert. "   Psychiatric:         Mood and Affect: Mood normal.           Assessment/Plan:  1. Skin tear of right forearm without complication, initial encounter    Based on physical exam along with review of systems I did go ahead and clean the site, it is nonsuturable, a small amount of glue was applied in an effort to hold the skin in place.  Reviewed ongoing wound management.  She is currently up-to-date on her tetanus.    Supportive care, differential diagnoses, and indications for immediate follow-up discussed with patient.   Pathogenesis of diagnosis discussed including typical length and natural progression.   Instructed to return to clinic or nearest emergency department for any change in condition, further concerns, or worsening of symptoms.  Patient states understanding of the plan of care and discharge instructions.  Instructed to make an appointment, for follow up, with primary care provider.    Please note that this dictation was created using voice recognition software. I have made every reasonable attempt to correct obvious errors, but I expect that there are errors of grammar and possibly content that I did not discover before finalizing the note.      Cherri BRADLEY

## 2024-11-01 ENCOUNTER — HOSPITAL ENCOUNTER (OUTPATIENT)
Dept: LAB | Facility: MEDICAL CENTER | Age: 79
End: 2024-11-01
Attending: INTERNAL MEDICINE
Payer: MEDICARE

## 2024-11-01 DIAGNOSIS — E55.9 VITAMIN D DEFICIENCY: ICD-10-CM

## 2024-11-01 DIAGNOSIS — E78.5 DYSLIPIDEMIA: ICD-10-CM

## 2024-11-01 DIAGNOSIS — R73.01 IFG (IMPAIRED FASTING GLUCOSE): ICD-10-CM

## 2024-11-01 LAB
25(OH)D3 SERPL-MCNC: 64 NG/ML (ref 30–100)
ALBUMIN SERPL BCP-MCNC: 4 G/DL (ref 3.2–4.9)
ALBUMIN/GLOB SERPL: 1.5 G/DL
ALP SERPL-CCNC: 75 U/L (ref 30–99)
ALT SERPL-CCNC: 13 U/L (ref 2–50)
ANION GAP SERPL CALC-SCNC: 8 MMOL/L (ref 7–16)
AST SERPL-CCNC: 26 U/L (ref 12–45)
BASOPHILS # BLD AUTO: 1.4 % (ref 0–1.8)
BASOPHILS # BLD: 0.08 K/UL (ref 0–0.12)
BILIRUB SERPL-MCNC: 0.4 MG/DL (ref 0.1–1.5)
BUN SERPL-MCNC: 20 MG/DL (ref 8–22)
CALCIUM ALBUM COR SERPL-MCNC: 10 MG/DL (ref 8.5–10.5)
CALCIUM SERPL-MCNC: 10 MG/DL (ref 8.5–10.5)
CHLORIDE SERPL-SCNC: 102 MMOL/L (ref 96–112)
CHOLEST SERPL-MCNC: 180 MG/DL (ref 100–199)
CO2 SERPL-SCNC: 22 MMOL/L (ref 20–33)
CREAT SERPL-MCNC: 0.86 MG/DL (ref 0.5–1.4)
EOSINOPHIL # BLD AUTO: 0.48 K/UL (ref 0–0.51)
EOSINOPHIL NFR BLD: 8.3 % (ref 0–6.9)
ERYTHROCYTE [DISTWIDTH] IN BLOOD BY AUTOMATED COUNT: 45.9 FL (ref 35.9–50)
EST. AVERAGE GLUCOSE BLD GHB EST-MCNC: 114 MG/DL
FASTING STATUS PATIENT QL REPORTED: NORMAL
GFR SERPLBLD CREATININE-BSD FMLA CKD-EPI: 68 ML/MIN/1.73 M 2
GLOBULIN SER CALC-MCNC: 2.6 G/DL (ref 1.9–3.5)
GLUCOSE SERPL-MCNC: 97 MG/DL (ref 65–99)
HBA1C MFR BLD: 5.6 % (ref 4–5.6)
HCT VFR BLD AUTO: 39.7 % (ref 37–47)
HDLC SERPL-MCNC: 72 MG/DL
HGB BLD-MCNC: 13.3 G/DL (ref 12–16)
IMM GRANULOCYTES # BLD AUTO: 0.01 K/UL (ref 0–0.11)
IMM GRANULOCYTES NFR BLD AUTO: 0.2 % (ref 0–0.9)
LDLC SERPL CALC-MCNC: 84 MG/DL
LYMPHOCYTES # BLD AUTO: 1.71 K/UL (ref 1–4.8)
LYMPHOCYTES NFR BLD: 29.4 % (ref 22–41)
MCH RBC QN AUTO: 32.6 PG (ref 27–33)
MCHC RBC AUTO-ENTMCNC: 33.5 G/DL (ref 32.2–35.5)
MCV RBC AUTO: 97.3 FL (ref 81.4–97.8)
MONOCYTES # BLD AUTO: 0.55 K/UL (ref 0–0.85)
MONOCYTES NFR BLD AUTO: 9.5 % (ref 0–13.4)
NEUTROPHILS # BLD AUTO: 2.98 K/UL (ref 1.82–7.42)
NEUTROPHILS NFR BLD: 51.2 % (ref 44–72)
NRBC # BLD AUTO: 0 K/UL
NRBC BLD-RTO: 0 /100 WBC (ref 0–0.2)
PLATELET # BLD AUTO: 285 K/UL (ref 164–446)
PMV BLD AUTO: 9.8 FL (ref 9–12.9)
POTASSIUM SERPL-SCNC: 4.6 MMOL/L (ref 3.6–5.5)
PROT SERPL-MCNC: 6.6 G/DL (ref 6–8.2)
RBC # BLD AUTO: 4.08 M/UL (ref 4.2–5.4)
SODIUM SERPL-SCNC: 132 MMOL/L (ref 135–145)
TRIGL SERPL-MCNC: 118 MG/DL (ref 0–149)
TSH SERPL-ACNC: 1.17 UIU/ML (ref 0.35–5.5)
WBC # BLD AUTO: 5.8 K/UL (ref 4.8–10.8)

## 2024-11-01 PROCEDURE — 36415 COLL VENOUS BLD VENIPUNCTURE: CPT

## 2024-11-01 PROCEDURE — 80053 COMPREHEN METABOLIC PANEL: CPT

## 2024-11-01 PROCEDURE — 82306 VITAMIN D 25 HYDROXY: CPT

## 2024-11-01 PROCEDURE — 83036 HEMOGLOBIN GLYCOSYLATED A1C: CPT | Mod: GA

## 2024-11-01 PROCEDURE — 85025 COMPLETE CBC W/AUTO DIFF WBC: CPT

## 2024-11-01 PROCEDURE — 84443 ASSAY THYROID STIM HORMONE: CPT

## 2024-11-01 PROCEDURE — 80061 LIPID PANEL: CPT

## 2024-11-05 ENCOUNTER — APPOINTMENT (OUTPATIENT)
Dept: MEDICAL GROUP | Age: 79
End: 2024-11-05
Payer: MEDICARE

## 2024-11-07 ENCOUNTER — APPOINTMENT (OUTPATIENT)
Dept: MEDICAL GROUP | Age: 79
End: 2024-11-07
Payer: MEDICARE

## 2024-11-07 VITALS
HEART RATE: 76 BPM | DIASTOLIC BLOOD PRESSURE: 70 MMHG | SYSTOLIC BLOOD PRESSURE: 112 MMHG | HEIGHT: 70 IN | TEMPERATURE: 97.2 F | BODY MASS INDEX: 17.9 KG/M2 | WEIGHT: 125 LBS | OXYGEN SATURATION: 97 %

## 2024-11-07 DIAGNOSIS — E87.1 HYPONATREMIA: ICD-10-CM

## 2024-11-07 DIAGNOSIS — F51.01 PRIMARY INSOMNIA: ICD-10-CM

## 2024-11-07 DIAGNOSIS — M81.8 OTHER OSTEOPOROSIS WITHOUT CURRENT PATHOLOGICAL FRACTURE: ICD-10-CM

## 2024-11-07 DIAGNOSIS — M85.80 OSTEOPENIA WITH HIGH RISK OF FRACTURE: ICD-10-CM

## 2024-11-07 PROCEDURE — 3074F SYST BP LT 130 MM HG: CPT | Performed by: STUDENT IN AN ORGANIZED HEALTH CARE EDUCATION/TRAINING PROGRAM

## 2024-11-07 PROCEDURE — 3078F DIAST BP <80 MM HG: CPT | Performed by: STUDENT IN AN ORGANIZED HEALTH CARE EDUCATION/TRAINING PROGRAM

## 2024-11-07 PROCEDURE — 99204 OFFICE O/P NEW MOD 45 MIN: CPT | Performed by: STUDENT IN AN ORGANIZED HEALTH CARE EDUCATION/TRAINING PROGRAM

## 2024-11-07 ASSESSMENT — FIBROSIS 4 INDEX: FIB4 SCORE: 2.02

## 2024-11-07 ASSESSMENT — ENCOUNTER SYMPTOMS
CHILLS: 0
SORE THROAT: 0
DIZZINESS: 0
NECK PAIN: 0
DEPRESSION: 0
BLURRED VISION: 0
WEIGHT LOSS: 0
COUGH: 0
HEADACHES: 0
MYALGIAS: 0
BLOOD IN STOOL: 0
FEVER: 0
CONSTIPATION: 0
TINGLING: 0
BACK PAIN: 0
HEMOPTYSIS: 0
EYE PAIN: 0
NAUSEA: 0
PALPITATIONS: 0
VOMITING: 0
EYE DISCHARGE: 0
ORTHOPNEA: 0
ABDOMINAL PAIN: 0
TREMORS: 0
DIARRHEA: 0
SHORTNESS OF BREATH: 0

## 2024-11-07 ASSESSMENT — PATIENT HEALTH QUESTIONNAIRE - PHQ9: CLINICAL INTERPRETATION OF PHQ2 SCORE: 0

## 2024-11-07 NOTE — PROGRESS NOTES
Verbal consent was acquired by the patient to use [employer] ambient listening note generation during this visit     Subjective:     HPI:   History of Present Illness  The patient is an 81-year-old female who is here for lab reviews.    She reports that her recent blood work has shown some abnormalities, which is unusual for her as her results have typically been normal. She consumed a large amount of water prior to her last blood draw. She maintains a healthy diet and has significantly reduced her alcohol consumption due to its impact on her sleep and headaches. She has never consumed fish. She has noticed an increase in her micro levels and has reduced her popcorn intake as a result.    She is currently focusing on her bone health and knee condition. She had a meniscus injury years ago from skiing and has been using red light therapy. Her chiropractor suggested that the issue might be with her hip rather than her knee. She has been using Osteo[facility] for 5 to 6 months and has seen improvements in her DEXA scans over the past 10 years. She is taking AlgaeCal instead of traditional bone medicine and has an appointment with [facility] this afternoon. She does not have any joint problems and takes vitamin C, B, D, and K supplements. She consumes green drinks in the morning and eats a lot of spinach. She had a hip injection 5 years ago and a thumb injection for an old ski injury.    She has been struggling with sleep and has tried various medications, including Ambien and Xanax, but has not found them helpful. She does not take daytime naps and usually gets about 4 hours of sleep per night.    FAMILY HISTORY  Her brother has osteoporosis.    Review of Systems   Constitutional:  Negative for chills, fever, malaise/fatigue and weight loss.   HENT:  Negative for congestion, ear pain, hearing loss and sore throat.    Eyes:  Negative for blurred vision, pain and discharge.   Respiratory:  Negative for cough, hemoptysis and  "shortness of breath.    Cardiovascular:  Negative for chest pain, palpitations and orthopnea.   Gastrointestinal:  Negative for abdominal pain, blood in stool, constipation, diarrhea, melena, nausea and vomiting.   Genitourinary:  Negative for dysuria, frequency, hematuria and urgency.   Musculoskeletal:  Negative for back pain, joint pain, myalgias and neck pain.   Skin:  Negative for rash.   Neurological:  Negative for dizziness, tingling, tremors and headaches.   Psychiatric/Behavioral:  Negative for depression and suicidal ideas.          Objective:     Exam:  /70 (BP Location: Right arm, Patient Position: Sitting, BP Cuff Size: Adult)   Pulse 76   Temp 36.2 °C (97.2 °F) (Temporal)   Ht 1.778 m (5' 10\")   Wt 56.7 kg (125 lb)   SpO2 97%   BMI 17.94 kg/m²  Body mass index is 17.94 kg/m².    Physical Exam  Constitutional:       General: She is not in acute distress.     Appearance: Normal appearance. She is not ill-appearing, toxic-appearing or diaphoretic.   HENT:      Head: Normocephalic and atraumatic.      Nose: Nose normal.      Mouth/Throat:      Mouth: Mucous membranes are moist.   Eyes:      General: No scleral icterus.        Right eye: No discharge.         Left eye: No discharge.      Extraocular Movements: Extraocular movements intact.      Conjunctiva/sclera: Conjunctivae normal.      Pupils: Pupils are equal, round, and reactive to light.   Cardiovascular:      Rate and Rhythm: Normal rate and regular rhythm.      Heart sounds: Normal heart sounds.   Pulmonary:      Effort: Pulmonary effort is normal. No respiratory distress.      Breath sounds: Normal breath sounds. No wheezing.   Chest:      Chest wall: No tenderness.   Abdominal:      General: Abdomen is flat. There is no distension.      Palpations: Abdomen is soft.      Tenderness: There is no abdominal tenderness. There is no guarding.   Musculoskeletal:         General: No swelling or tenderness. Normal range of motion.      " Cervical back: Normal range of motion and neck supple.      Right lower leg: No edema.      Left lower leg: No edema.   Skin:     General: Skin is warm and dry.      Coloration: Skin is not jaundiced.   Neurological:      General: No focal deficit present.      Mental Status: She is alert and oriented to person, place, and time. Mental status is at baseline.      Motor: No weakness.      Coordination: Coordination normal.      Gait: Gait normal.   Psychiatric:         Mood and Affect: Mood normal.         Behavior: Behavior normal.         Thought Content: Thought content normal.         Judgment: Judgment normal.             Results  Laboratory Studies  Vitamin D levels are normal. Kidney function is stable. A1c is slightly elevated but within normal range. Cholesterol levels are good. Sodium levels are mildly low. Eosinophils are slightly elevated. Thyroid function is normal.    Assessment & Plan:     1. Osteopenia with high risk of fracture        2. Other osteoporosis without current pathological fracture        3. Hyponatremia        4. Primary insomnia            Assessment & Plan  1. Lab Review.  Her vitamin D, kidney function, cholesterol, and thyroid levels are all within normal ranges. There is a slight increase in her A1c, but it remains within the normal range. Her sodium level is mildly low 132, likely due to increased water intake prior to the blood draw. Her eosinophil count is slightly elevated, but this is not a cause for concern unless accompanied by other symptoms. The swelling in her left ankle is not related to her sodium level and could be due to a sprain. She was advised to maintain a healthy diet and limit sugar intake. She was also advised to elevate her feet and wear compression stockings for her ankle swelling.    2. Osteoporosis.  She has been using [facility] to improve bone density and has seen improvements in her DEXA scans over the last 10 years. She was encouraged to continue with  [facility]. She prefers not to take bone medication and instead uses algal calcium supplements. She was advised to incorporate strength training into her exercise routine.    3. Sleep Disturbance.  She reports difficulty sleeping and has tried various medications, including Ambien and Xanax, without success. She was advised to practice good sleep hygiene, including exercising before bed, meditating, and avoiding daytime naps.                    Return if symptoms worsen or fail to improve.    Please note that this dictation was created using voice recognition software. I have made every reasonable attempt to correct obvious errors, but I expect that there are errors of grammar and possibly content that I did not discover before finalizing the note.

## 2025-04-02 ENCOUNTER — TELEPHONE (OUTPATIENT)
Dept: MEDICAL GROUP | Age: 80
End: 2025-04-02
Payer: MEDICARE

## 2025-04-02 DIAGNOSIS — E55.9 VITAMIN D DEFICIENCY: ICD-10-CM

## 2025-04-02 DIAGNOSIS — R73.01 IFG (IMPAIRED FASTING GLUCOSE): ICD-10-CM

## 2025-04-02 DIAGNOSIS — E78.5 DYSLIPIDEMIA: ICD-10-CM

## 2025-04-02 NOTE — TELEPHONE ENCOUNTER
Patient would like    blood work ordered she states a repeat of her last lab work please and thank you

## 2025-05-14 PROBLEM — M19.041 OSTEOARTHRITIS OF RIGHT HAND: Status: ACTIVE | Noted: 2025-05-14

## 2025-05-14 PROBLEM — M65.341 TRIGGER RING FINGER OF RIGHT HAND: Status: ACTIVE | Noted: 2025-05-14

## 2025-05-14 PROBLEM — M65.331 TRIGGER MIDDLE FINGER OF RIGHT HAND: Status: ACTIVE | Noted: 2025-05-14

## 2025-05-15 DIAGNOSIS — Z12.31 ENCOUNTER FOR SCREENING MAMMOGRAM FOR MALIGNANT NEOPLASM OF BREAST: Primary | ICD-10-CM

## 2025-06-04 ENCOUNTER — HOSPITAL ENCOUNTER (OUTPATIENT)
Dept: RADIOLOGY | Facility: MEDICAL CENTER | Age: 80
End: 2025-06-04
Attending: INTERNAL MEDICINE
Payer: MEDICARE

## 2025-06-04 DIAGNOSIS — Z12.31 ENCOUNTER FOR SCREENING MAMMOGRAM FOR MALIGNANT NEOPLASM OF BREAST: ICD-10-CM

## 2025-06-04 PROCEDURE — 77063 BREAST TOMOSYNTHESIS BI: CPT

## 2025-09-19 ENCOUNTER — APPOINTMENT (OUTPATIENT)
Dept: MEDICAL GROUP | Age: 80
End: 2025-09-19
Payer: MEDICARE